# Patient Record
Sex: FEMALE | Race: WHITE | NOT HISPANIC OR LATINO | URBAN - METROPOLITAN AREA
[De-identification: names, ages, dates, MRNs, and addresses within clinical notes are randomized per-mention and may not be internally consistent; named-entity substitution may affect disease eponyms.]

---

## 2017-01-31 ENCOUNTER — APPOINTMENT (RX ONLY)
Dept: URBAN - METROPOLITAN AREA CLINIC 170 | Facility: CLINIC | Age: 52
Setting detail: DERMATOLOGY
End: 2017-01-31

## 2017-01-31 DIAGNOSIS — Z41.9 ENCOUNTER FOR PROCEDURE FOR PURPOSES OTHER THAN REMEDYING HEALTH STATE, UNSPECIFIED: ICD-10-CM

## 2017-01-31 DIAGNOSIS — L82.1 OTHER SEBORRHEIC KERATOSIS: ICD-10-CM

## 2017-01-31 PROCEDURE — ? LIQUID NITROGEN (COSMETIC)

## 2017-01-31 ASSESSMENT — LOCATION SIMPLE DESCRIPTION DERM: LOCATION SIMPLE: LEFT FOREHEAD

## 2017-01-31 ASSESSMENT — LOCATION DETAILED DESCRIPTION DERM
LOCATION DETAILED: LEFT INFERIOR FOREHEAD
LOCATION DETAILED: LEFT INFERIOR MEDIAL FOREHEAD

## 2017-01-31 ASSESSMENT — LOCATION ZONE DERM: LOCATION ZONE: FACE

## 2017-01-31 NOTE — HPI: OTHER
Please Describe Your Condition:: No known contraindications to treatment with botulinum toxin. See \"Cosmetic Procedure\" note in Attachments.

## 2017-01-31 NOTE — PROCEDURE: LIQUID NITROGEN (COSMETIC)
Detail Level: Detailed
Render Post-Care Instructions In Note?: no
Consent: The patient's consent was obtained including but not limited to risks of crusting, scabbing, blistering, scarring, darker or lighter pigmentary change, recurrence, incomplete removal and infection. The patient understands that the procedure is cosmetic in nature and is not covered by insurance.
Price (Use Numbers Only, No Special Characters Or $): 60
Post-Care Instructions: I reviewed with the patient in detail post-care instructions. Patient is to wear sunprotection, and avoid picking at any of the treated lesions. Pt may apply Vaseline to crusted or scabbing areas.

## 2017-02-01 ENCOUNTER — APPOINTMENT (RX ONLY)
Dept: URBAN - METROPOLITAN AREA CLINIC 167 | Facility: CLINIC | Age: 52
Setting detail: DERMATOLOGY
End: 2017-02-01

## 2017-02-01 DIAGNOSIS — Z41.9 ENCOUNTER FOR PROCEDURE FOR PURPOSES OTHER THAN REMEDYING HEALTH STATE, UNSPECIFIED: ICD-10-CM

## 2017-02-01 NOTE — HPI: OTHER
Condition:: Filler tx
Please Describe Your Condition:: No known contraindications to soft tissue augmentation with AMOL. See \"Cosmetic Procedure\" note in Attachments.

## 2017-05-30 ENCOUNTER — APPOINTMENT (RX ONLY)
Dept: URBAN - METROPOLITAN AREA CLINIC 170 | Facility: CLINIC | Age: 52
Setting detail: DERMATOLOGY
End: 2017-05-30

## 2017-05-30 DIAGNOSIS — Z41.9 ENCOUNTER FOR PROCEDURE FOR PURPOSES OTHER THAN REMEDYING HEALTH STATE, UNSPECIFIED: ICD-10-CM

## 2017-05-30 PROCEDURE — ? FILLERS

## 2017-05-30 PROCEDURE — ? BOTOX

## 2017-05-30 PROCEDURE — ? DYSPORT

## 2017-05-30 NOTE — HPI: OTHER
Condition:: Filler/btx-a tx
Please Describe Your Condition:: No known contraindications to soft tissue augmentation with AMOL; no known contraindications to treatment with botulinum toxin. See \"Cosmetic Procedure\" note in Attachments.

## 2017-05-30 NOTE — PROCEDURE: FILLERS
Additional Area 3 Volume In Cc: 0
Filler: Juvederm Ultra Plus XC
Additional Area 1 Location: Face
Expiration Date (Month Year): 02/19
Anesthesia Type: 1% lidocaine without epinephrine
Post-Care Instructions: Patient instructed to apply ice to reduce swelling.
Anesthesia Volume In Cc: 0.5
Map Statment: See Attach Map for Complete Details
Lot #: [id number]
Additional Area 1 Volume In Cc: 1
Lot #: [id number]
Detail Level: Zone
Additional Anesthesia Volume In Cc: 6
Use Map Statement For Sites (Optional): No
Filler: Juvederm Volbella XC
Consent: Written consent obtained. Risks include but not limited to bruising, beading, irregular texture, ulceration, infection, allergic reaction, scar formation, incomplete augmentation, temporary nature, procedural pain.

## 2017-05-30 NOTE — PROCEDURE: BOTOX
Additional Area 5 Units: 0
Expiration Date (Month Year): 12/19
Additional Area 1 Location: Face
Additional Area 1 Units: 45
Lot #: [id number]
Detail Level: Zone
Dilution (U/0.1 Cc): 4
Consent: Written consent obtained. Risks include but not limited to lid/brow ptosis, bruising, swelling, diplopia, temporary effect, incomplete chemical denervation.

## 2017-07-25 ENCOUNTER — APPOINTMENT (RX ONLY)
Dept: URBAN - METROPOLITAN AREA CLINIC 170 | Facility: CLINIC | Age: 52
Setting detail: DERMATOLOGY
End: 2017-07-25

## 2017-07-25 DIAGNOSIS — Z029 ENCOUNTERS FOR UNSPECIFIED ADMINISTRATIVE PURPOSE: ICD-10-CM

## 2017-07-25 PROBLEM — Z02.9 ENCOUNTER FOR ADMINISTRATIVE EXAMINATIONS, UNSPECIFIED: Status: ACTIVE | Noted: 2017-07-25

## 2017-07-25 PROCEDURE — ? OTHER (COSMETIC)

## 2017-07-25 NOTE — PROCEDURE: OTHER (COSMETIC)
Other (Free Text): No tx today; care of upper lip lines (cta, hydration); will defer additional filler but plan on Vb for mid lower lip and cutaneous upper lip
Detail Level: Zone

## 2017-08-24 ENCOUNTER — APPOINTMENT (RX ONLY)
Dept: URBAN - METROPOLITAN AREA CLINIC 170 | Facility: CLINIC | Age: 52
Setting detail: DERMATOLOGY
End: 2017-08-24

## 2017-08-24 DIAGNOSIS — Z41.9 ENCOUNTER FOR PROCEDURE FOR PURPOSES OTHER THAN REMEDYING HEALTH STATE, UNSPECIFIED: ICD-10-CM

## 2017-08-24 PROCEDURE — ? BOTOX

## 2017-08-24 PROCEDURE — ? DYSPORT

## 2017-08-24 NOTE — PROCEDURE: DYSPORT
Additional Area 1 Location: neck bands
Additional Area 1 Units: 40
Lot #: [id number]
Additional Area 4 Units: 0
Dilution (U/ 0.1cc): 10
Expiration Date (Month Year): 11/17
Consent: Written consent obtained. Risks include but not limited to lid/brow ptosis, bruising, swelling, diplopia, temporary effect, incomplete chemical denervation.
Detail Level: Zone

## 2017-11-01 ENCOUNTER — APPOINTMENT (RX ONLY)
Dept: URBAN - METROPOLITAN AREA CLINIC 167 | Facility: CLINIC | Age: 52
Setting detail: DERMATOLOGY
End: 2017-11-01

## 2017-11-01 DIAGNOSIS — Z41.9 ENCOUNTER FOR PROCEDURE FOR PURPOSES OTHER THAN REMEDYING HEALTH STATE, UNSPECIFIED: ICD-10-CM

## 2017-11-01 PROCEDURE — ? DYSPORT

## 2017-11-01 PROCEDURE — ? FILLERS

## 2017-11-01 PROCEDURE — ? BOTOX

## 2017-11-01 NOTE — PROCEDURE: BOTOX
Consent: Written consent obtained. Risks include but not limited to lid/brow ptosis, bruising, swelling, diplopia, temporary effect, incomplete chemical denervation.
Additional Area 1 Units: 45
Additional Area 2 Units: 0
Detail Level: Zone
Expiration Date (Month Year): 04/20
Dilution (U/0.1 Cc): 4
Lot #: [id number]
Additional Area 1 Location: Face

## 2017-11-01 NOTE — PROCEDURE: DYSPORT
Additional Area 1 Location: Neck
Dilution (U/ 0.1cc): 10
Additional Area 2 Units: 0
Expiration Date (Month Year): 02/18
Detail Level: Zone
Additional Area 1 Units: 40
Consent: Written consent obtained. Risks include but not limited to lid/brow ptosis, bruising, swelling, diplopia, temporary effect, incomplete chemical denervation.
Lot #: [id number]

## 2017-11-01 NOTE — PROCEDURE: FILLERS
Additional Area 1 Volume In Cc: 0
Filler: Juvederm Volbella XC
Consent: Written consent obtained. Risks include but not limited to bruising, beading, irregular texture, ulceration, infection, allergic reaction, scar formation, incomplete augmentation, temporary nature, procedural pain.
Lot #: [id number]
Lot #: [id number]
Anesthesia Type: 1% lidocaine without epinephrine
Additional Area 1 Volume In Cc: 0.5
Additional Anesthesia Volume In Cc: 6
Use Map Statement For Sites (Optional): No
Post-Care Instructions: Patient instructed to apply ice to reduce swelling.
Lot #: [id number]
Detail Level: Zone
Additional Area 1 Location: Face
Additional Area 1 Volume In Cc: 1
Expiration Date (Month Year): 05/19
Map Statment: See Attach Map for Complete Details
Expiration Date (Month Year): 11/18
Filler: Juvederm Ultra Plus XC

## 2017-11-01 NOTE — HPI: OTHER
Condition:: Filler/btx-a tx
Please Describe Your Condition:: No known contraindications to soft tissue augmentation with AMOL; no known contraindications to treatment with botulinum toxin. See “Cosmetic Procedure” note in Attachments.

## 2018-01-23 ENCOUNTER — APPOINTMENT (RX ONLY)
Dept: URBAN - METROPOLITAN AREA CLINIC 170 | Facility: CLINIC | Age: 53
Setting detail: DERMATOLOGY
End: 2018-01-23

## 2018-01-23 DIAGNOSIS — Z41.9 ENCOUNTER FOR PROCEDURE FOR PURPOSES OTHER THAN REMEDYING HEALTH STATE, UNSPECIFIED: ICD-10-CM

## 2018-01-23 PROCEDURE — ? BOTOX

## 2018-01-23 PROCEDURE — ? FILLERS

## 2018-01-23 NOTE — HPI: OTHER
Condition:: Filler/btx tx
Please Describe Your Condition:: No known contraindications to soft tissue augmentation with AMOL; no known contraindications to treatment with botulinum toxin. See “Cosmetic Procedure” note in Attachments.

## 2018-01-23 NOTE — PROCEDURE: BOTOX
Levator Labii Superioris Units: 0
Detail Level: Zone
Additional Area 1 Location: Face
Expiration Date (Month Year): 08/20
Lot #: [id number]
Additional Area 1 Units: 16
Consent: Written consent obtained. Risks include but not limited to lid/brow ptosis, bruising, swelling, diplopia, temporary effect, incomplete chemical denervation.
Dilution (U/0.1 Cc): 4

## 2018-01-23 NOTE — PROCEDURE: FILLERS
Expiration Date (Month Year): 7/19
Mid Face Filler  Volume In Cc: 0
Anesthesia Volume In Cc: 0.5
Consent: Written consent obtained. Risks include but not limited to bruising, beading, irregular texture, ulceration, infection, allergic reaction, scar formation, incomplete augmentation, temporary nature, procedural pain.
Post-Care Instructions: Patient instructed to apply ice to reduce swelling.
Additional Area 1 Location: Face
Additional Anesthesia Volume In Cc: 6
Use Map Statement For Sites (Optional): No
Lot #: [id number]
Lot #: [id number] *JKS special
Expiration Date (Month Year): 08/19
Lot #: [id number]
Detail Level: Zone
Anesthesia Type: 1% lidocaine without epinephrine
Filler: Juvederm Volbella XC
Map Statment: See Attach Map for Complete Details
Expiration Date (Month Year): 05/19

## 2018-04-13 ENCOUNTER — APPOINTMENT (RX ONLY)
Dept: URBAN - METROPOLITAN AREA CLINIC 170 | Facility: CLINIC | Age: 53
Setting detail: DERMATOLOGY
End: 2018-04-13

## 2018-04-13 DIAGNOSIS — Z41.9 ENCOUNTER FOR PROCEDURE FOR PURPOSES OTHER THAN REMEDYING HEALTH STATE, UNSPECIFIED: ICD-10-CM

## 2018-04-13 PROCEDURE — ? TREATMENT REGIMEN

## 2018-04-13 PROCEDURE — ? BOTOX

## 2018-04-13 ASSESSMENT — LOCATION DETAILED DESCRIPTION DERM: LOCATION DETAILED: RIGHT INFERIOR CENTRAL MALAR CHEEK

## 2018-04-13 ASSESSMENT — LOCATION SIMPLE DESCRIPTION DERM: LOCATION SIMPLE: RIGHT CHEEK

## 2018-04-13 ASSESSMENT — LOCATION ZONE DERM: LOCATION ZONE: FACE

## 2018-04-13 NOTE — PROCEDURE: TREATMENT REGIMEN
Plan: Alastin Restorative Complex am and Pm \\nEpionce Intense Nourishing \\nSPF \\nRecommended sanket for microneedling
Detail Level: Zone

## 2018-04-13 NOTE — PROCEDURE: BOTOX
Additional Area 4 Units: 0
Expiration Date (Month Year): 10/20
Price (Use Numbers Only, No Special Characters Or $): 727
Detail Level: Zone
Consent: Written consent obtained. Risks include but not limited to lid/brow ptosis, bruising, swelling, diplopia, temporary effect, incomplete chemical denervation.
Dilution (U/0.1 Cc): 0.2
Additional Area 1 Location: Face
Post-Care Instructions: Patient instructed to not lie down for 4 hours and limit physical activity for 24 hours.
Additional Area 1 Units: 79
Lot #: [id number]

## 2018-04-23 ENCOUNTER — APPOINTMENT (RX ONLY)
Dept: URBAN - METROPOLITAN AREA CLINIC 167 | Facility: CLINIC | Age: 53
Setting detail: DERMATOLOGY
End: 2018-04-23

## 2018-04-23 DIAGNOSIS — Z41.9 ENCOUNTER FOR PROCEDURE FOR PURPOSES OTHER THAN REMEDYING HEALTH STATE, UNSPECIFIED: ICD-10-CM

## 2018-04-23 PROCEDURE — ? FILLERS

## 2018-04-23 NOTE — PROCEDURE: FILLERS
Decollete Filler  Volume In Cc: 0
Additional Area 1 Location: Face
Lot #: [id number]
Lot #: [id number]
Anesthesia Volume In Cc: 0.5
Use Map Statement For Sites (Optional): No
Consent: Written consent obtained. Risks include but not limited to bruising, beading, irregular texture, ulceration, infection, allergic reaction, scar formation, incomplete augmentation, temporary nature, procedural pain.
Expiration Date (Month Year): 05/19
Post-Care Instructions: Patient instructed to apply ice to reduce swelling.
Lot #: [id number]
Anesthesia Type: 1% lidocaine without epinephrine
Expiration Date (Month Year): 12/19
Filler: Juvederm Volbella XC
Map Statment: See Attach Map for Complete Details
Detail Level: Zone
Additional Anesthesia Volume In Cc: 6

## 2018-06-29 ENCOUNTER — RX ONLY (OUTPATIENT)
Age: 53
Setting detail: RX ONLY
End: 2018-06-29

## 2018-06-29 RX ORDER — LIDOCAINE, PRILOCAINE 25; 25 MG/G; MG/G
CREAM TOPICAL
Qty: 1 | Refills: 3 | Status: ERX

## 2018-07-10 ENCOUNTER — APPOINTMENT (RX ONLY)
Dept: URBAN - METROPOLITAN AREA CLINIC 170 | Facility: CLINIC | Age: 53
Setting detail: DERMATOLOGY
End: 2018-07-10

## 2018-07-10 DIAGNOSIS — L82.1 OTHER SEBORRHEIC KERATOSIS: ICD-10-CM

## 2018-07-10 DIAGNOSIS — L57.3 POIKILODERMA OF CIVATTE: ICD-10-CM

## 2018-07-10 DIAGNOSIS — Z71.89 OTHER SPECIFIED COUNSELING: ICD-10-CM

## 2018-07-10 DIAGNOSIS — D22 MELANOCYTIC NEVI: ICD-10-CM

## 2018-07-10 PROBLEM — D22.5 MELANOCYTIC NEVI OF TRUNK: Status: ACTIVE | Noted: 2018-07-10

## 2018-07-10 PROCEDURE — ? COUNSELING

## 2018-07-10 PROCEDURE — 99213 OFFICE O/P EST LOW 20 MIN: CPT

## 2018-07-10 ASSESSMENT — LOCATION SIMPLE DESCRIPTION DERM
LOCATION SIMPLE: RIGHT ANTERIOR NECK
LOCATION SIMPLE: LEFT ANTERIOR NECK
LOCATION SIMPLE: LEFT UPPER BACK

## 2018-07-10 ASSESSMENT — LOCATION DETAILED DESCRIPTION DERM
LOCATION DETAILED: LEFT SUPERIOR ANTERIOR NECK
LOCATION DETAILED: LEFT MEDIAL UPPER BACK
LOCATION DETAILED: RIGHT SUPERIOR ANTERIOR NECK

## 2018-07-10 ASSESSMENT — LOCATION ZONE DERM
LOCATION ZONE: TRUNK
LOCATION ZONE: NECK

## 2018-07-25 ENCOUNTER — APPOINTMENT (RX ONLY)
Dept: URBAN - METROPOLITAN AREA CLINIC 167 | Facility: CLINIC | Age: 53
Setting detail: DERMATOLOGY
End: 2018-07-25

## 2018-07-25 DIAGNOSIS — Z41.9 ENCOUNTER FOR PROCEDURE FOR PURPOSES OTHER THAN REMEDYING HEALTH STATE, UNSPECIFIED: ICD-10-CM

## 2018-07-25 PROCEDURE — ? BOTOX

## 2018-07-25 PROCEDURE — ? DYSPORT

## 2018-07-25 PROCEDURE — ? FILLERS

## 2018-07-25 NOTE — PROCEDURE: FILLERS
Additional Area 2 Volume In Cc: 0
Expiration Date (Month Year): 05/19
Detail Level: Zone
Consent: Written consent obtained. Risks include but not limited to bruising, beading, irregular texture, ulceration, infection, allergic reaction, scar formation, incomplete augmentation, temporary nature, procedural pain.
Anesthesia Volume In Cc: 0.5
Anesthesia Type: 1% lidocaine without epinephrine
Filler: Juvederm Ultra XC
Use Map Statement For Sites (Optional): No
Lot #: [id number]
Additional Area 1 Location: Face
Lot #: [id number]
Post-Care Instructions: Patient instructed to apply ice to reduce swelling.
Map Statment: See Attach Map for Complete Details
Additional Anesthesia Volume In Cc: 6
Lot #: [id number]
Additional Area 1 Volume In Cc: 1

## 2018-07-25 NOTE — HPI: OTHER
Condition:: Filler/btx-a treatment
Please Describe Your Condition:: comes in for Filler/btx-a treatment . No known contraindications to soft tissue augmentation with AMOL; no known contraindications to treatment with botulinum toxin. See “Cosmetic Procedure” note in Attachments.

## 2018-07-25 NOTE — PROCEDURE: DYSPORT
Additional Area 1 Units: 40
Forehead Units: 0
Expiration Date (Month Year): 12/18
Dilution (U/ 0.1cc): 10
No
Additional Area 1 Location: Neck
Lot #: [id number]
Detail Level: Zone
Consent: Written consent obtained. Risks include but not limited to lid/brow ptosis, bruising, swelling, diplopia, temporary effect, incomplete chemical denervation.

## 2018-08-22 ENCOUNTER — APPOINTMENT (RX ONLY)
Dept: URBAN - METROPOLITAN AREA CLINIC 167 | Facility: CLINIC | Age: 53
Setting detail: DERMATOLOGY
End: 2018-08-22

## 2018-08-22 DIAGNOSIS — Z41.9 ENCOUNTER FOR PROCEDURE FOR PURPOSES OTHER THAN REMEDYING HEALTH STATE, UNSPECIFIED: ICD-10-CM

## 2018-08-22 PROCEDURE — ? FILLERS

## 2018-08-22 NOTE — PROCEDURE: FILLERS
Decollete Filler  Volume In Cc: 0
Include Cannula Information In Note?: No
Expiration Date (Month Year): 01/20
Post-Care Instructions: Patient instructed to apply ice to reduce swelling.
Anesthesia Type: 1% lidocaine without epinephrine
Additional Area 1 Location: Face
Expiration Date (Month Year): 12/19
Filler: Juvederm Volbella XC
Lot #: [id number]
Map Statment: See Attach Map for Complete Details
Detail Level: Zone
Lot #: [id number]
Consent: Written consent obtained. Risks include but not limited to bruising, beading, irregular texture, ulceration, infection, allergic reaction, scar formation, incomplete augmentation, temporary nature, procedural pain.
Additional Anesthesia Volume In Cc: 6
Anesthesia Volume In Cc: 0.5
Lot #: [id number]
Expiration Date (Month Year): 10/20

## 2018-11-08 ENCOUNTER — APPOINTMENT (RX ONLY)
Dept: URBAN - METROPOLITAN AREA CLINIC 170 | Facility: CLINIC | Age: 53
Setting detail: DERMATOLOGY
End: 2018-11-08

## 2018-11-08 DIAGNOSIS — Z41.9 ENCOUNTER FOR PROCEDURE FOR PURPOSES OTHER THAN REMEDYING HEALTH STATE, UNSPECIFIED: ICD-10-CM

## 2018-11-08 PROCEDURE — ? FILLERS

## 2018-11-08 PROCEDURE — ? BOTOX

## 2018-11-08 PROCEDURE — ? DYSPORT

## 2018-11-08 NOTE — PROCEDURE: DYSPORT
Expiration Date (Month Year): 05/19
Additional Area 6 Units: 0
Additional Area 1 Units: 40
Detail Level: Zone
Lot #: [id number]
Dilution (U/ 0.1cc): 10
Consent: Written consent obtained. Risks include but not limited to lid/brow ptosis, bruising, swelling, diplopia, temporary effect, incomplete chemical denervation.
Additional Area 1 Location: Face and neck bands

## 2018-11-08 NOTE — PROCEDURE: FILLERS
Mid Face Filler  Volume In Cc: 0
Include Cannula Information In Note?: No
Lot #: [id number]
Detail Level: Zone
Expiration Date (Month Year): 10/19
Additional Area 1 Volume In Cc: 0.5
Map Statment: See Attach Map for Complete Details
Expiration Date (Month Year): 06/20
Lot #: [id number]
Lot #: [id number]
Additional Area 1 Location: Face
Filler: Juvederm Volbella XC
Anesthesia Type: 1% lidocaine without epinephrine
Additional Anesthesia Volume In Cc: 6
Consent: Written consent obtained. Risks include but not limited to bruising, beading, irregular texture, ulceration, infection, allergic reaction, scar formation, incomplete augmentation, temporary nature, procedural pain.
Post-Care Instructions: Patient instructed to apply ice to reduce swelling.

## 2018-11-08 NOTE — HPI: OTHER
Condition:: Filler/btx-a tx
Please Describe Your Condition:: comes in for Filler/btx-a tx . No known contraindications to soft tissue augmentation with AMOL; no known contraindications to treatment with botulinum toxin. See “Cosmetic Procedure” note in Attachments.

## 2018-11-08 NOTE — PROCEDURE: BOTOX
Glabellar Complex Units: 0
Additional Area 1 Units: 45
Dilution (U/0.1 Cc): 4
Additional Area 1 Location: Face
Consent: Written consent obtained. Risks include but not limited to lid/brow ptosis, bruising, swelling, diplopia, temporary effect, incomplete chemical denervation.
Lot #: [id number]
Expiration Date (Month Year): 04/21
Detail Level: Zone

## 2019-01-31 ENCOUNTER — APPOINTMENT (RX ONLY)
Dept: URBAN - METROPOLITAN AREA CLINIC 170 | Facility: CLINIC | Age: 54
Setting detail: DERMATOLOGY
End: 2019-01-31

## 2019-01-31 DIAGNOSIS — Z41.9 ENCOUNTER FOR PROCEDURE FOR PURPOSES OTHER THAN REMEDYING HEALTH STATE, UNSPECIFIED: ICD-10-CM

## 2019-01-31 PROCEDURE — ? PATIENT SPECIFIC COUNSELING

## 2019-01-31 PROCEDURE — ? BOTOX

## 2019-01-31 ASSESSMENT — LOCATION SIMPLE DESCRIPTION DERM
LOCATION SIMPLE: LEFT CHEEK
LOCATION SIMPLE: LEFT LIP

## 2019-01-31 ASSESSMENT — LOCATION ZONE DERM
LOCATION ZONE: FACE
LOCATION ZONE: LIP

## 2019-01-31 ASSESSMENT — LOCATION DETAILED DESCRIPTION DERM
LOCATION DETAILED: LEFT CENTRAL MALAR CHEEK
LOCATION DETAILED: LEFT LOWER CUTANEOUS LIP

## 2019-01-31 NOTE — PROCEDURE: BOTOX
Nasal Root Units: 0
Lot #: [id number]
Consent: Written consent obtained. Risks include but not limited to lid/brow ptosis, bruising, swelling, diplopia, temporary effect, incomplete chemical denervation.
Additional Area 1 Location: face
Expiration Date (Month Year): 5/21
Detail Level: Zone
Additional Area 1 Units: 83
Dilution (U/0.1 Cc): 0.2
Post-Care Instructions: Patient instructed to not lie down for 4 hours and limit physical activity for 24 hours.
Price (Use Numbers Only, No Special Characters Or $): 814

## 2019-01-31 NOTE — PROCEDURE: PATIENT SPECIFIC COUNSELING
Recommend 3 treatments of SkinPen microneedling $550/tx. Continue Alastin neck cream daily, and alastin restorative twice a day after finishing bottle of AlphaRet. Alpha Ret as tolerated to face everynight at bedtime.
Detail Level: Zone

## 2019-04-22 ENCOUNTER — APPOINTMENT (RX ONLY)
Dept: URBAN - METROPOLITAN AREA CLINIC 167 | Facility: CLINIC | Age: 54
Setting detail: DERMATOLOGY
End: 2019-04-22

## 2019-04-22 DIAGNOSIS — Z41.9 ENCOUNTER FOR PROCEDURE FOR PURPOSES OTHER THAN REMEDYING HEALTH STATE, UNSPECIFIED: ICD-10-CM

## 2019-04-22 PROCEDURE — ? DYSPORT

## 2019-04-22 PROCEDURE — ? BOTOX

## 2019-04-22 NOTE — HPI: OTHER
Condition:: Btx-a tx
Please Describe Your Condition:: No known contraindications to treatment with botulinum toxin. see “Cosmetic Procedure” note in Attachments.

## 2019-04-22 NOTE — PROCEDURE: DYSPORT
Consent: Written consent obtained. Risks include but not limited to lid/brow ptosis, bruising, swelling, diplopia, temporary effect, incomplete chemical denervation.
Dilution (U/ 0.1cc): 10
Lateral Platysmal Bands Units: 0
Detail Level: Zone
Additional Area 1 Units: 40
Expiration Date (Month Year): 05/19
Lot #: [id number]
Additional Area 1 Location: Face and neck bands
Patient expressed no known problems or needs

## 2019-04-22 NOTE — PROCEDURE: BOTOX
Masseter Units: 0
Additional Area 1 Units: 45
Dilution (U/0.1 Cc): 4
Consent: Written consent obtained. Risks include but not limited to lid/brow ptosis, bruising, swelling, diplopia, temporary effect, incomplete chemical denervation.
Additional Area 1 Location: Face
Expiration Date (Month Year): 9/21
Detail Level: Zone
Lot #: [id number]

## 2019-05-30 ENCOUNTER — APPOINTMENT (RX ONLY)
Dept: URBAN - METROPOLITAN AREA CLINIC 170 | Facility: CLINIC | Age: 54
Setting detail: DERMATOLOGY
End: 2019-05-30

## 2019-05-30 DIAGNOSIS — Z41.9 ENCOUNTER FOR PROCEDURE FOR PURPOSES OTHER THAN REMEDYING HEALTH STATE, UNSPECIFIED: ICD-10-CM

## 2019-05-30 PROCEDURE — ? DYSPORT

## 2019-05-30 NOTE — PROCEDURE: DYSPORT
Detail Level: Zone
Anterior Platysmal Bands Units: 0
Expiration Date (Month Year): 12/19
Lot #: [id number]
Additional Area 1 Location: Face
Dilution (U/ 0.1cc): 10
Additional Area 1 Units: 30
Consent: Written consent obtained. Risks include but not limited to lid/brow ptosis, bruising, swelling, diplopia, temporary effect, incomplete chemical denervation.

## 2019-05-30 NOTE — HPI: OTHER
Condition:: Cosmetic f/u
Please Describe Your Condition:: comes in for Cosmetic f/u . No known contraindications to treatment with botulinum toxin. See “Cosmetic Procedure” note in Attachments.

## 2019-07-09 ENCOUNTER — APPOINTMENT (RX ONLY)
Dept: URBAN - METROPOLITAN AREA CLINIC 170 | Facility: CLINIC | Age: 54
Setting detail: DERMATOLOGY
End: 2019-07-09

## 2019-07-09 DIAGNOSIS — Z41.9 ENCOUNTER FOR PROCEDURE FOR PURPOSES OTHER THAN REMEDYING HEALTH STATE, UNSPECIFIED: ICD-10-CM

## 2019-07-09 PROCEDURE — ? BOTOX

## 2019-07-09 PROCEDURE — ? FILLERS

## 2019-07-09 NOTE — PROCEDURE: FILLERS
Cheeks Filler Volume In Cc: 0
Lot #: [id number]
Lot #: [id number] * KIRT Special *
Map Statment: See Attach Map for Complete Details
Expiration Date (Month Year): 04/20
Expiration Date (Month Year): 12/21
Include Cannula Information In Note?: No
Anesthesia Type: 1% lidocaine without epinephrine
Lot #: [id number]
Anesthesia Volume In Cc: 0.5
Expiration Date (Month Year): 01/20
Additional Area 1 Location: Face
Additional Anesthesia Volume In Cc: 6
Consent: Written consent obtained. Risks include but not limited to bruising, beading, irregular texture, ulceration, infection, allergic reaction, scar formation, incomplete augmentation, temporary nature, procedural pain.
Post-Care Instructions: Patient instructed to apply ice to reduce swelling.
Filler: Juvederm Volbella XC
Detail Level: Zone

## 2019-07-09 NOTE — PROCEDURE: BOTOX
Additional Area 5 Units: 0
Dilution (U/0.1 Cc): 4
Expiration Date (Month Year): 10/21
Additional Area 1 Units: 35
Detail Level: Zone
Lot #: [id number]
Additional Area 1 Location: Face
Consent: Written consent obtained. Risks include but not limited to lid/brow ptosis, bruising, swelling, diplopia, temporary effect, incomplete chemical denervation.

## 2019-07-09 NOTE — HPI: OTHER
Condition:: Filler/Botox-a tx
Please Describe Your Condition:: comes in for Filler/Botox-a tx . No known contraindications to soft tissue augmentation with AMOL; no known contraindications to treatment with botulinum toxin. See “Cosmetic Procedure” note in Attachments

## 2019-10-09 ENCOUNTER — APPOINTMENT (RX ONLY)
Dept: URBAN - METROPOLITAN AREA CLINIC 167 | Facility: CLINIC | Age: 54
Setting detail: DERMATOLOGY
End: 2019-10-09

## 2019-10-09 DIAGNOSIS — Z41.9 ENCOUNTER FOR PROCEDURE FOR PURPOSES OTHER THAN REMEDYING HEALTH STATE, UNSPECIFIED: ICD-10-CM

## 2019-10-09 PROCEDURE — ? FILLERS

## 2019-10-09 PROCEDURE — ? BOTOX

## 2019-10-09 NOTE — HPI: OTHER
Condition:: Filler/btx-a treatment
Please Describe Your Condition:: comes in for Filler/btx-a treatment. No known contraindications to soft tissue augmentation with AMOL ; no known contraindications to treatment with botulinum toxin. See  “Cosmetic Procedure” note in Attachments.

## 2019-10-09 NOTE — PROCEDURE: BOTOX
Show Mentalis Units: No
Right Periorbital Units: 0
Show Nasal Units: Yes
Detail Level: Zone
Additional Area 1 Location: Face
Expiration Date (Month Year): 2/22
Additional Area 1 Units: 25
Consent: Written consent obtained. Risks include but not limited to lid/brow ptosis, bruising, swelling, diplopia, temporary effect, incomplete chemical denervation.
Lot #: [id number]
Dilution (U/0.1 Cc): 4

## 2019-10-09 NOTE — PROCEDURE: FILLERS
Additional Area 2 Volume In Cc: 0
Lot #: [id number]
Include Cannula Information In Note?: No
Lot #: [id number]
Expiration Date (Month Year): 01/20
Filler: Juvederm Volbella XC
Expiration Date (Month Year): 04/20
Detail Level: Zone
Lot #: [id number] *KIRT Special*
Map Statment: See Attach Map for Complete Details
Expiration Date (Month Year): 12/20/19
Additional Area 1 Location: Face
Anesthesia Type: 1% lidocaine without epinephrine
Anesthesia Volume In Cc: 0.5
Consent: Written consent obtained. Risks include but not limited to bruising, beading, irregular texture, ulceration, infection, allergic reaction, scar formation, incomplete augmentation, temporary nature, procedural pain.
Post-Care Instructions: Patient instructed to apply ice to reduce swelling.
Additional Anesthesia Volume In Cc: 6

## 2019-10-21 ENCOUNTER — APPOINTMENT (RX ONLY)
Dept: URBAN - METROPOLITAN AREA CLINIC 167 | Facility: CLINIC | Age: 54
Setting detail: DERMATOLOGY
End: 2019-10-21

## 2019-10-21 DIAGNOSIS — Z41.9 ENCOUNTER FOR PROCEDURE FOR PURPOSES OTHER THAN REMEDYING HEALTH STATE, UNSPECIFIED: ICD-10-CM

## 2019-10-21 PROCEDURE — ? BOTOX

## 2019-10-21 NOTE — PROCEDURE: BOTOX
Show Mentalis Units: No
Additional Area 5 Units: 0
Show Additional Area 4: Yes
Additional Area 1 Location: Face
Expiration Date (Month Year): 02/22
Additional Area 1 Units: 40
Lot #: [id number]
Consent: Written consent obtained. Risks include but not limited to lid/brow ptosis, bruising, swelling, diplopia, temporary effect, incomplete chemical denervation.
Dilution (U/0.1 Cc): 4
Detail Level: Zone

## 2020-02-10 ENCOUNTER — APPOINTMENT (RX ONLY)
Dept: URBAN - METROPOLITAN AREA CLINIC 173 | Facility: CLINIC | Age: 55
Setting detail: DERMATOLOGY
End: 2020-02-10

## 2020-02-10 DIAGNOSIS — Z41.9 ENCOUNTER FOR PROCEDURE FOR PURPOSES OTHER THAN REMEDYING HEALTH STATE, UNSPECIFIED: ICD-10-CM

## 2020-02-10 PROCEDURE — ? BOTOX

## 2020-02-10 NOTE — HPI: OTHER
Condition:: Btx-a tx
Please Describe Your Condition:: comes in for Btx-a tx . No known contraindications to treatment with botulinum toxin. See “Cosmetic Procedure” note in attachments.

## 2020-02-10 NOTE — PROCEDURE: BOTOX
Nasal Root Units: 0
Show Lcl Units: No
Show Lateral Platysmal Band Units: Yes
Dilution (U/0.1 Cc): 4
Detail Level: Zone
Expiration Date (Month Year): 8/22
Additional Area 1 Location: Face
Additional Area 1 Units: 44
Consent: Written consent obtained. Risks include but not limited to lid/brow ptosis, bruising, swelling, diplopia, temporary effect, incomplete chemical denervation.
Lot #: [id number]

## 2020-02-25 ENCOUNTER — APPOINTMENT (RX ONLY)
Dept: URBAN - METROPOLITAN AREA CLINIC 173 | Facility: CLINIC | Age: 55
Setting detail: DERMATOLOGY
End: 2020-02-25

## 2020-02-25 DIAGNOSIS — Z41.9 ENCOUNTER FOR PROCEDURE FOR PURPOSES OTHER THAN REMEDYING HEALTH STATE, UNSPECIFIED: ICD-10-CM

## 2020-02-25 PROCEDURE — ? DYSPORT

## 2020-02-25 NOTE — PROCEDURE: DYSPORT
Detail Level: Zone
Anterior Platysmal Bands Units: 0
Lot #: [id number]
Additional Area 1 Units: 70
Expiration Date (Month Year): 5/31/20
Dilution (U/ 0.1cc): 10
Consent: Written consent obtained. Risks include but not limited to lid/brow ptosis, bruising, swelling, diplopia, temporary effect, incomplete chemical denervation.
Additional Area 1 Location: face & neck bands

## 2020-05-14 ENCOUNTER — APPOINTMENT (RX ONLY)
Dept: URBAN - METROPOLITAN AREA CLINIC 173 | Facility: CLINIC | Age: 55
Setting detail: DERMATOLOGY
End: 2020-05-14

## 2020-05-14 DIAGNOSIS — Z41.9 ENCOUNTER FOR PROCEDURE FOR PURPOSES OTHER THAN REMEDYING HEALTH STATE, UNSPECIFIED: ICD-10-CM

## 2020-05-14 PROCEDURE — ? FILLERS

## 2020-05-14 PROCEDURE — ? BOTOX

## 2020-05-14 NOTE — PROCEDURE: BOTOX
Depressor Anguli Oris Units: 0
Lot #: [id number]
Show Additional Area 4: Yes
Consent: Written consent obtained. Risks include but not limited to lid/brow ptosis, bruising, swelling, diplopia, temporary effect, incomplete chemical denervation.
Show Ucl Units: No
Dilution (U/0.1 Cc): 4
Additional Area 1 Location: Face
Expiration Date (Month Year): 09/22
Additional Area 1 Units: 44
Detail Level: Zone

## 2020-05-14 NOTE — PROCEDURE: FILLERS
Additional Area 3 Volume In Cc: 0
Detail Level: Zone
Filler: Juvederm Volbella XC
Use Map Statement For Sites (Optional): No
Lot #: [id number]
Map Statment: See Attach Map for Complete Details
Lot #: [id number]*KIRT SPECIAL*
Expiration Date (Month Year): 01/20
Consent: Written consent obtained. Risks include but not limited to bruising, beading, irregular texture, ulceration, infection, allergic reaction, scar formation, incomplete augmentation, temporary nature, procedural pain.
Lot #: [id number] *KIRT Special *
Post-Care Instructions: Patient instructed to apply ice to reduce swelling.
Expiration Date (Month Year): 08/20
Anesthesia Type: 1% lidocaine without epinephrine
Expiration Date (Month Year): 12/19
Anesthesia Volume In Cc: 0.5
Additional Area 1 Location: Face
Additional Anesthesia Volume In Cc: 6

## 2020-05-14 NOTE — HPI: OTHER
Condition:: Filler/btx-a tx
Please Describe Your Condition:: is being seen for Filler/btx-a tx . No known contraindications to soft tissue augmentation with AMOL; no known contraindications to treatment with botulinum toxin. See “Cosmetic Procedure”note in Attachments.

## 2020-08-12 ENCOUNTER — APPOINTMENT (RX ONLY)
Dept: URBAN - METROPOLITAN AREA CLINIC 173 | Facility: CLINIC | Age: 55
Setting detail: DERMATOLOGY
End: 2020-08-12

## 2020-08-12 DIAGNOSIS — Z41.9 ENCOUNTER FOR PROCEDURE FOR PURPOSES OTHER THAN REMEDYING HEALTH STATE, UNSPECIFIED: ICD-10-CM

## 2020-08-12 PROCEDURE — ? DYSPORT

## 2020-08-12 PROCEDURE — ? BOTOX

## 2020-08-12 NOTE — PROCEDURE: DYSPORT
Show Additional Area 6: Yes
Show Ucl Units: No
Additional Area 5 Units: 0
Consent: Written consent obtained. Risks include but not limited to lid/brow ptosis, bruising, swelling, diplopia, temporary effect, incomplete chemical denervation.
Expiration Date (Month Year): 11/20
Additional Area 1 Location: Neck bands
Dilution (U/ 0.1cc): 10
Detail Level: Zone
Lot #: [id number]
Additional Area 1 Units: 50

## 2020-08-12 NOTE — PROCEDURE: BOTOX
Additional Area 4 Units: 0
Show Additional Area 4: Yes
Detail Level: Zone
Expiration Date (Month Year): 02/23
Additional Area 1 Units: 44
Show Right And Left Brow Units: No
Additional Area 1 Location: Face
Dilution (U/0.1 Cc): 4
Consent: Written consent obtained. Risks include but not limited to lid/brow ptosis, bruising, swelling, diplopia, temporary effect, incomplete chemical denervation.
Lot #: [id number]

## 2020-11-10 ENCOUNTER — APPOINTMENT (RX ONLY)
Dept: URBAN - METROPOLITAN AREA CLINIC 173 | Facility: CLINIC | Age: 55
Setting detail: DERMATOLOGY
End: 2020-11-10

## 2020-11-10 DIAGNOSIS — Z41.9 ENCOUNTER FOR PROCEDURE FOR PURPOSES OTHER THAN REMEDYING HEALTH STATE, UNSPECIFIED: ICD-10-CM

## 2020-11-10 PROCEDURE — ? FILLERS

## 2020-11-10 PROCEDURE — ? BOTOX

## 2020-11-10 PROCEDURE — ? DYSPORT

## 2020-11-10 NOTE — PROCEDURE: FILLERS
Dorsal Hands Filler  Volume In Cc: 0
Additional Anesthesia Volume In Cc: 6
Additional Area 1 Location: Face
Additional Area 1 Volume In Cc: 1
Include Cannula Information In Note?: No
Detail Level: Zone
Filler: RHA 2
Lot #: [id number] *KIRT Special *
Map Statment: See Attach Map for Complete Details
Lot #: [id number]
Expiration Date (Month Year): 12/19
Lot #: [id number]
Expiration Date (Month Year): 01/20
Expiration Date (Month Year): 02/24/23
Anesthesia Type: 1% lidocaine without epinephrine
Anesthesia Volume In Cc: 0.5
Consent: Written consent obtained. Risks include but not limited to bruising, beading, irregular texture, ulceration, infection, allergic reaction, scar formation, incomplete augmentation, temporary nature, procedural pain.
Post-Care Instructions: Patient instructed to apply ice to reduce swelling.

## 2020-11-10 NOTE — PROCEDURE: DYSPORT
Dilution (U/ 0.1cc): 10
Glabellar Complex Units: 0
Additional Area 1 Location: Face and Neckbands
Show Levator Superior Units: Yes
Show Mentalis Units: No
Lot #: [id number]
Additional Area 1 Units: 50
Detail Level: Zone
Consent: Written consent obtained. Risks include but not limited to lid/brow ptosis, bruising, swelling, diplopia, temporary effect, incomplete chemical denervation.
Expiration Date (Month Year): 02/21

## 2020-11-10 NOTE — PROCEDURE: BOTOX
Lot #: [id number]
Show Levator Superior Units: Yes
Lateral Platysmal Bands Units: 0
Show Mentalis Units: No
Dilution (U/0.1 Cc): 4
Additional Area 1 Location: Face
Consent: Written consent obtained. Risks include but not limited to lid/brow ptosis, bruising, swelling, diplopia, temporary effect, incomplete chemical denervation.
Detail Level: Zone
Additional Area 1 Units: 44
Expiration Date (Month Year): 06/23

## 2021-01-06 ENCOUNTER — APPOINTMENT (RX ONLY)
Dept: URBAN - METROPOLITAN AREA CLINIC 173 | Facility: CLINIC | Age: 56
Setting detail: DERMATOLOGY
End: 2021-01-06

## 2021-01-06 DIAGNOSIS — Z41.9 ENCOUNTER FOR PROCEDURE FOR PURPOSES OTHER THAN REMEDYING HEALTH STATE, UNSPECIFIED: ICD-10-CM

## 2021-01-06 PROCEDURE — ? DYSPORT

## 2021-01-06 PROCEDURE — ? BOTOX

## 2021-01-06 NOTE — PROCEDURE: DYSPORT
Show Orbicularis Oculi Units: Yes
Mentalis Units: 0
Additional Area 1 Units: 30
Lot #: [id number]
Consent: Written consent obtained. Risks include but not limited to lid/brow ptosis, bruising, swelling, diplopia, temporary effect, incomplete chemical denervation.
Show Right And Left Pupillary Line Units: No
Dilution (U/ 0.1cc): 10
Detail Level: Zone
Expiration Date (Month Year): 8/21
Additional Area 1 Location: Face

## 2021-01-06 NOTE — PROCEDURE: BOTOX
Additional Area 1 Units: 15
Expiration Date (Month Year): 8/23
L Brow Units: 0
Show Mentalis Units: No
Show Additional Area 2: Yes
Detail Level: Zone
Lot #: [id number]
Consent: Written consent obtained. Risks include but not limited to lid/brow ptosis, bruising, swelling, diplopia, temporary effect, incomplete chemical denervation.
Dilution (U/0.1 Cc): 4
Additional Area 1 Location: Face

## 2021-02-23 ENCOUNTER — APPOINTMENT (RX ONLY)
Dept: URBAN - METROPOLITAN AREA CLINIC 173 | Facility: CLINIC | Age: 56
Setting detail: DERMATOLOGY
End: 2021-02-23

## 2021-02-23 ENCOUNTER — RX ONLY (OUTPATIENT)
Age: 56
Setting detail: RX ONLY
End: 2021-02-23

## 2021-02-23 DIAGNOSIS — Z41.9 ENCOUNTER FOR PROCEDURE FOR PURPOSES OTHER THAN REMEDYING HEALTH STATE, UNSPECIFIED: ICD-10-CM

## 2021-02-23 PROCEDURE — ? THERMAGE FLX

## 2021-02-23 RX ORDER — LIDOCAINE AND PRILOCAINE 25; 25 MG/G; MG/G
CREAM TOPICAL
Qty: 1 | Refills: 3 | Status: ERX

## 2021-02-23 ASSESSMENT — LOCATION DETAILED DESCRIPTION DERM
LOCATION DETAILED: LEFT INFERIOR CENTRAL MALAR CHEEK
LOCATION DETAILED: RIGHT SUPERIOR ANTERIOR NECK

## 2021-02-23 ASSESSMENT — LOCATION SIMPLE DESCRIPTION DERM
LOCATION SIMPLE: RIGHT ANTERIOR NECK
LOCATION SIMPLE: LEFT CHEEK

## 2021-02-23 ASSESSMENT — LOCATION ZONE DERM
LOCATION ZONE: NECK
LOCATION ZONE: FACE

## 2021-02-23 NOTE — PROCEDURE: THERMAGE FLX
Post-Care Instructions: I reviewed with the patient in detail post-care instructions. Patient should stay away from the sun and wear sun protection until treated areas are fully healed.
Post-Procedures Photographs: Yes
Repetitions: 900
Treatment Number: 1
Number Of Vectors: 12
Post-Procedure Text: Post care reviewed with patient.
Thermage Tip: Face Tip 4.0
Minimum Treatment Setting: 0.5
Consent: Written consent obtained, risks reviewed including but not limited to crusting, scabbing, blistering, scarring, darker or lighter pigmentary change, and/or incomplete removal.
Detail Level: Zone
Maximum Treatment Settin.5
Number Of Passes: 10
Patient Discomfort: mild
Treatment Endpoint: visual tightening
Immediate Post-Procedure Findings: moderate erythema, mild edema
Price (Use Numbers Only, No Special Characters Or $): 3789

## 2021-02-23 NOTE — HPI: COSMETIC PROCEDURE
Additional History: She has had one previous treatment with minimal improvement. ([facility])

## 2021-03-01 ENCOUNTER — APPOINTMENT (RX ONLY)
Dept: URBAN - METROPOLITAN AREA CLINIC 173 | Facility: CLINIC | Age: 56
Setting detail: DERMATOLOGY
End: 2021-03-01

## 2021-03-01 DIAGNOSIS — Z41.9 ENCOUNTER FOR PROCEDURE FOR PURPOSES OTHER THAN REMEDYING HEALTH STATE, UNSPECIFIED: ICD-10-CM

## 2021-03-01 PROCEDURE — ? DIAMONDGLOW

## 2021-03-01 PROCEDURE — ? DERMAPLANE

## 2021-03-01 ASSESSMENT — LOCATION DETAILED DESCRIPTION DERM
LOCATION DETAILED: LEFT CENTRAL MALAR CHEEK
LOCATION DETAILED: LEFT INFERIOR CENTRAL MALAR CHEEK

## 2021-03-01 ASSESSMENT — LOCATION SIMPLE DESCRIPTION DERM: LOCATION SIMPLE: LEFT CHEEK

## 2021-03-01 ASSESSMENT — LOCATION ZONE DERM: LOCATION ZONE: FACE

## 2021-03-01 NOTE — PROCEDURE: DIAMONDGLOW
Price (Use Numbers Only, No Special Characters Or $): 700
Post-Care Instructions: I reviewed with the patient in detail post-care instructions. Patient should stay away from the sun and wear sun protection until treated areas are fully healed.
Detail Level: Zone
Solution Used: Skin Hydrating Solution
Number Of Passes: 2
Consent: Written consent obtained, risks reviewed including but not limited to crusting, scabbing, blistering, scarring, darker or lighter pigmentary change, bruising, and/or incomplete response.
Facial Treatment Head Used?: Facial: Fine (120 grit) 6 mm
Intelliflow Setting: 100%
Body Treatment Head Used?: Not Used
Vacuum Pressure In Inches: 3.5

## 2021-03-01 NOTE — PROCEDURE: DERMAPLANE
Pre-Procedure Text: The patient was placed in a recumbant position on the procedure table.
Detail Level: Zone
Blade: 10 blade scalpel
Post-Procedure Instructions: Following the dermaplane procedure, a moisturizer and SPF was applied to the treatment areas.
Treatment Areas: face
Treatment Area Prep: alcohol
Post-Care Instructions: I reviewed with the patient in detail post-care instructions.

## 2021-03-08 ENCOUNTER — APPOINTMENT (RX ONLY)
Dept: URBAN - METROPOLITAN AREA CLINIC 173 | Facility: CLINIC | Age: 56
Setting detail: DERMATOLOGY
End: 2021-03-08

## 2021-03-08 DIAGNOSIS — Z41.9 ENCOUNTER FOR PROCEDURE FOR PURPOSES OTHER THAN REMEDYING HEALTH STATE, UNSPECIFIED: ICD-10-CM

## 2021-03-08 PROCEDURE — ? DYSPORT

## 2021-03-08 PROCEDURE — ? BOTOX

## 2021-03-08 NOTE — PROCEDURE: DYSPORT
L Brow Units: 0
Show Right And Left Periorbital Units: No
Show Masseter Units: Yes
Detail Level: Zone
Additional Area 1 Location: Face
Consent: Written consent obtained. Risks include but not limited to lid/brow ptosis, bruising, swelling, diplopia, temporary effect, incomplete chemical denervation.
Additional Area 1 Units: 50
Dilution (U/ 0.1cc): 10
Lot #: [id number]
Expiration Date (Month Year): 9/21

## 2021-05-10 ENCOUNTER — APPOINTMENT (RX ONLY)
Dept: URBAN - METROPOLITAN AREA CLINIC 173 | Facility: CLINIC | Age: 56
Setting detail: DERMATOLOGY
End: 2021-05-10

## 2021-05-10 DIAGNOSIS — Z41.9 ENCOUNTER FOR PROCEDURE FOR PURPOSES OTHER THAN REMEDYING HEALTH STATE, UNSPECIFIED: ICD-10-CM

## 2021-05-10 PROCEDURE — ? FILLERS

## 2021-05-10 PROCEDURE — ? BOTOX

## 2021-05-10 NOTE — PROCEDURE: FILLERS
Additional Area 4 Volume In Cc: 0
Include Cannula Information In Note?: No
Lot #: [id number]
Anesthesia Volume In Cc: 0.5
Expiration Date (Month Year): 08/23
Expiration Date (Month Year): 01/20
Lot #: [id number]
Additional Area 1 Location: Face
Additional Area 1 Volume In Cc: 1
Detail Level: Zone
Additional Anesthesia Volume In Cc: 6
Consent: Written consent obtained. Risks include but not limited to bruising, beading, irregular texture, ulceration, infection, allergic reaction, scar formation, incomplete augmentation, temporary nature, procedural pain.
Map Statment: See Attach Map for Complete Details
Filler: RHA 2
Post-Care Instructions: Patient instructed to apply ice to reduce swelling.
Filler: RHA 3
Anesthesia Type: 1% lidocaine without epinephrine
Lot #: [id number]

## 2021-05-10 NOTE — PROCEDURE: BOTOX
Right Periorbital Units: 0
Consent: Written consent obtained. Risks include but not limited to lid/brow ptosis, bruising, swelling, diplopia, temporary effect, incomplete chemical denervation.
Show Right And Left Pupillary Line Units: No
Show Masseter Units: Yes
Dilution (U/0.1 Cc): 4
Lot #: [id number] * SPECIAL *
Additional Area 1 Location: Face
Expiration Date (Month Year): 07/23
Detail Level: Zone

## 2021-07-23 ENCOUNTER — APPOINTMENT (RX ONLY)
Dept: URBAN - METROPOLITAN AREA CLINIC 173 | Facility: CLINIC | Age: 56
Setting detail: DERMATOLOGY
End: 2021-07-23

## 2021-07-23 DIAGNOSIS — Z41.9 ENCOUNTER FOR PROCEDURE FOR PURPOSES OTHER THAN REMEDYING HEALTH STATE, UNSPECIFIED: ICD-10-CM

## 2021-07-23 PROCEDURE — ? BOTOX

## 2021-07-23 NOTE — PROCEDURE: BOTOX
Left Pupillary Line Units: 0
Price (Use Numbers Only, No Special Characters Or $): 1000
Lot #: [id number]
Show Additional Area 6: Yes
Detail Level: Zone
Dilution (U/0.1 Cc): 0.2
Show Ucl Units: No
Consent: Written consent obtained. Risks include but not limited to lid/brow ptosis, bruising, swelling, diplopia, temporary effect, incomplete chemical denervation.
Additional Area 1 Location: face
Post-Care Instructions: Patient instructed to not lie down for 4 hours and limit physical activity for 24 hours.
Additional Area 1 Units: 100
Expiration Date (Month Year): 10/23

## 2021-09-13 ENCOUNTER — APPOINTMENT (RX ONLY)
Dept: URBAN - METROPOLITAN AREA CLINIC 173 | Facility: CLINIC | Age: 56
Setting detail: DERMATOLOGY
End: 2021-09-13

## 2021-09-13 DIAGNOSIS — Z41.9 ENCOUNTER FOR PROCEDURE FOR PURPOSES OTHER THAN REMEDYING HEALTH STATE, UNSPECIFIED: ICD-10-CM

## 2021-09-13 PROCEDURE — ? FRAXEL

## 2021-09-13 ASSESSMENT — LOCATION SIMPLE DESCRIPTION DERM
LOCATION SIMPLE: LEFT ANTERIOR NECK
LOCATION SIMPLE: CHEST

## 2021-09-13 ASSESSMENT — LOCATION ZONE DERM
LOCATION ZONE: TRUNK
LOCATION ZONE: NECK

## 2021-09-13 ASSESSMENT — LOCATION DETAILED DESCRIPTION DERM
LOCATION DETAILED: MIDDLE STERNUM
LOCATION DETAILED: LEFT INFERIOR ANTERIOR NECK

## 2021-09-13 NOTE — HPI: COSMETIC (LASER RESURFACING)
Have You Had Laser Resurfacing Before?: has had previous treatments
When Was Your Last Laser Resurfacing Treatment?: Years ago

## 2021-09-13 NOTE — PROCEDURE: FRAXEL
Treatment Level: 6
Energy(Mj/Cm2): 1
Total Coverage: 23%
Location: neck
Energy(Mj/Cm2): 40
Number Of Passes: 8
Indication: resurfacing
Consent: Written consent obtained, risks reviewed including but not limited to pain and incomplete improvement.
Wavelength: 1550nm
Energy(Mj/Cm2): 20
Number Of Passes: 4
Add Post-Care Below To The Note: No
Length Of Topical Anesthesia Application (Optional): 90 minutes
Total Coverage: 15%
Anesthesia Volume In Cc: 0
Energy(Mj/Cm2): 30
Energy(Mj/Cm2): 30
External Cooling: Mumtaz Cryo 6
Post-Care Instructions: I reviewed with the patient in detail post-care instructions. Patient should avoid sun until area fully healed.
Total Energy In Kj (Optional- Don't Include Units): 6.38
Treatment Level: 7
Energy(Mj/Cm2): 20
Detail Level: Zone
Location: decolletage of the chest
Topical Anesthesia Type: 23% lidocaine, 7% tetracaine
Price (Use Numbers Only, No Special Characters Or $): 1100

## 2021-09-24 ENCOUNTER — APPOINTMENT (RX ONLY)
Dept: URBAN - METROPOLITAN AREA CLINIC 173 | Facility: CLINIC | Age: 56
Setting detail: DERMATOLOGY
End: 2021-09-24

## 2021-09-24 DIAGNOSIS — Z41.9 ENCOUNTER FOR PROCEDURE FOR PURPOSES OTHER THAN REMEDYING HEALTH STATE, UNSPECIFIED: ICD-10-CM

## 2021-09-24 PROCEDURE — ? PIXEL8

## 2021-09-24 PROCEDURE — ? SECRET RF

## 2021-09-24 NOTE — PROCEDURE: SECRET RF
Tip: 24-Insulated
Depth In Microns: 0.5
Passes: 0
Additional Treatment Area: periorbital
Rf Duration (Include Units If Applicable): 300ms
Detail Level: Zone
Consent: Written consent obtained, risks reviewed including but not limited to darker or lighter pigmentary change, and/or incomplete improvement.
Depth In Microns: 1
Rf Duration (Include Units If Applicable): 250
Intensity (Include Units If Applicable): 7/7/6
Passes: 2
Tip: 64-Insulated
Indication: Skin Laxity
Additional Treatment Area: mid-lower face
Post-Care Instructions: I reviewed with the patient in detail post-care instructions. Alastin skin nectar applied post, apply at home x 1-2 days.
Intensity (Include Units If Applicable): 6
Passes: 3
Rf Duration (Include Units If Applicable): 300
Rf Duration (Include Units If Applicable): 250/200
Rf Duration (Include Units If Applicable): 200ms
Rf Duration (Include Units If Applicable): 300
Rf Duration (Include Units If Applicable): 250
Intensity (Include Units If Applicable): 5
Render Post-Care In The Note: Yes
Intensity (Include Units If Applicable): 7
Tip: 24-Noninsulated
Depth In Microns: 1.5
Use Distraction Techniques In Note: No
Treatment Area: forehead
Intensity (Include Units If Applicable): 7/6/6

## 2021-09-24 NOTE — PROCEDURE: PIXEL8
Tip: 49 pin Non-Insulated
Price (Use Numbers Only, No Special Characters Or $): 200
Passes: 0
Intensity: 7/7/6
Intensity: 7/6/6
Use Distraction Techniques In Note: No
Passes: 3
Post-Care Instructions: I reviewed with the patient in detail post-care instructions. Patient should apply moisturizer until fully healed.
Detail Level: Zone
Rf Duration (Include Units If Applicable): 200
Consent: Written consent obtained, risks reviewed including but not limited to darker or lighter pigmentary change, and/or incomplete improvement.
Rf Duration (Include Units If Applicable): 300
Render Post-Care In The Note: Yes
Delay (Include Units If Applicable): 300 ms
Intensity: 6/5/5
Rf Duration (Include Units If Applicable): 300

## 2021-10-06 ENCOUNTER — APPOINTMENT (RX ONLY)
Dept: URBAN - METROPOLITAN AREA CLINIC 173 | Facility: CLINIC | Age: 56
Setting detail: DERMATOLOGY
End: 2021-10-06

## 2021-10-06 DIAGNOSIS — Z41.9 ENCOUNTER FOR PROCEDURE FOR PURPOSES OTHER THAN REMEDYING HEALTH STATE, UNSPECIFIED: ICD-10-CM

## 2021-10-06 PROCEDURE — ? BOTOX

## 2021-10-06 NOTE — PROCEDURE: BOTOX
Anterior Platysmal Bands Units: 0
Price (Use Numbers Only, No Special Characters Or $): 1000
Show Additional Area 2: Yes
Lot #: [id number]
Forehead Units: 8
Additional Area 2 Units: 4
Consent: Written consent obtained. Risks include but not limited to lid/brow ptosis, bruising, swelling, diplopia, temporary effect, incomplete chemical denervation.
Show Ucl Units: No
Dilution (U/0.1 Cc): 5
Glabellar Complex Units: 15
Additional Area 3 Location: Platysmal Bands
Additional Area 1 Location: Obicularis
Post-Care Instructions: Patient instructed to not lie down for 4 hours and limit physical activity for 24 hours. Patient instructed not to travel by airplane for 48 hours.
Additional Area 3 Units: 56
Additional Area 1 Units: 20
Expiration Date (Month Year): 01/2024
Detail Level: Detailed
Additional Area 2 Location: Lips

## 2021-10-25 ENCOUNTER — RX ONLY (OUTPATIENT)
Age: 56
Setting detail: RX ONLY
End: 2021-10-25

## 2021-10-25 ENCOUNTER — APPOINTMENT (RX ONLY)
Dept: URBAN - METROPOLITAN AREA CLINIC 170 | Facility: CLINIC | Age: 56
Setting detail: DERMATOLOGY
End: 2021-10-25

## 2021-10-25 ENCOUNTER — APPOINTMENT (RX ONLY)
Dept: URBAN - METROPOLITAN AREA CLINIC 173 | Facility: CLINIC | Age: 56
Setting detail: DERMATOLOGY
End: 2021-10-25

## 2021-10-25 DIAGNOSIS — Z41.9 ENCOUNTER FOR PROCEDURE FOR PURPOSES OTHER THAN REMEDYING HEALTH STATE, UNSPECIFIED: ICD-10-CM

## 2021-10-25 PROCEDURE — ? FRAXEL

## 2021-10-25 RX ORDER — LIDOCAINE AND PRILOCAINE 25; 25 MG/G; MG/G
CREAM TOPICAL
Qty: 30 | Refills: 1 | Status: ERX

## 2021-10-25 ASSESSMENT — LOCATION DETAILED DESCRIPTION DERM
LOCATION DETAILED: MIDDLE STERNUM
LOCATION DETAILED: LEFT INFERIOR ANTERIOR NECK
LOCATION DETAILED: LEFT INFERIOR ANTERIOR NECK
LOCATION DETAILED: MIDDLE STERNUM

## 2021-10-25 ASSESSMENT — LOCATION ZONE DERM
LOCATION ZONE: NECK
LOCATION ZONE: NECK
LOCATION ZONE: TRUNK
LOCATION ZONE: TRUNK

## 2021-10-25 ASSESSMENT — LOCATION SIMPLE DESCRIPTION DERM
LOCATION SIMPLE: LEFT ANTERIOR NECK
LOCATION SIMPLE: CHEST
LOCATION SIMPLE: LEFT ANTERIOR NECK
LOCATION SIMPLE: CHEST

## 2021-10-25 NOTE — HPI: COSMETIC (LASER RESURFACING)
Have You Had Laser Resurfacing Before?: has had previous treatments
When Was Your Last Laser Resurfacing Treatment?: 9/13/21

## 2021-10-25 NOTE — PROCEDURE: FRAXEL
Treatment Level: 6
Energy(Mj/Cm2): 1
Total Coverage: 23%
Location: neck
Energy(Mj/Cm2): 40
Number Of Passes: 8
Indication: resurfacing
Consent: Written consent obtained, risks reviewed including but not limited to pain and incomplete improvement.
Wavelength: 1550nm
Energy(Mj/Cm2): 30
Number Of Passes: 4
Add Post-Care Below To The Note: No
Length Of Topical Anesthesia Application (Optional): 90 minutes
Total Coverage: 15%
Anesthesia Volume In Cc: 0
Energy(Mj/Cm2): 30
External Cooling: Mumtaz Cryo 6
Post-Care Instructions: I reviewed with the patient in detail post-care instructions. Patient should avoid sun until area fully healed.
Total Energy In Kj (Optional- Don't Include Units): 6.64
Treatment Level: 7
Detail Level: Zone
Treatment Number: 2
Location: decolletage of the chest
Topical Anesthesia Type: 23% lidocaine, 7% tetracaine
Price (Use Numbers Only, No Special Characters Or $): 1100

## 2021-10-25 NOTE — PROCEDURE: FRAXEL
Treatment Level: 6
Energy(Mj/Cm2): 1
Total Coverage: 23%
Location: neck
Energy(Mj/Cm2): 40
Number Of Passes: 8
Indication: resurfacing
Consent: Written consent obtained, risks reviewed including but not limited to pain and incomplete improvement.
Wavelength: 1550nm
Energy(Mj/Cm2): 20
Number Of Passes: 4
Add Post-Care Below To The Note: No
Length Of Topical Anesthesia Application (Optional): 60 minutes
Total Coverage: 15%
Anesthesia Volume In Cc: 0
Energy(Mj/Cm2): 30
Energy(Mj/Cm2): 30
External Cooling: Mumtaz Cryo 6
Post-Care Instructions: I reviewed with the patient in detail post-care instructions. Patient should avoid sun until area fully healed.
Total Energy In Kj (Optional- Don't Include Units): 6.38
Treatment Level: 7
Energy(Mj/Cm2): 20
Detail Level: Zone
Treatment Number: 2
Location: decolletage of the chest
Topical Anesthesia Type: 23% lidocaine, 7% tetracaine
Price (Use Numbers Only, No Special Characters Or $): 1100

## 2021-10-26 NOTE — PROCEDURE: BOTOX
Depressor Anguli Oris Units: 0
Show Ucl Units: No
Lot #: [id number]
Show Orbicularis Oculi Units: Yes
Detail Level: Zone
Dilution (U/0.1 Cc): 4
Consent: Written consent obtained. Risks include but not limited to lid/brow ptosis, bruising, swelling, diplopia, temporary effect, incomplete chemical denervation.
Additional Area 1 Location: Face
Expiration Date (Month Year): 10/23
Additional Area 1 Units: 45
Photo Preface (Leave Blank If You Do Not Want): Photographs were obtained today
Detail Level: Zone

## 2021-11-29 ENCOUNTER — RX ONLY (OUTPATIENT)
Age: 56
Setting detail: RX ONLY
End: 2021-11-29

## 2021-11-29 RX ORDER — LIDOCAINE AND PRILOCAINE 25; 25 MG/G; MG/G
CREAM TOPICAL
Qty: 30 | Refills: 3 | Status: ERX

## 2021-11-30 ENCOUNTER — APPOINTMENT (RX ONLY)
Dept: URBAN - METROPOLITAN AREA CLINIC 173 | Facility: CLINIC | Age: 56
Setting detail: DERMATOLOGY
End: 2021-11-30

## 2021-11-30 DIAGNOSIS — Z41.9 ENCOUNTER FOR PROCEDURE FOR PURPOSES OTHER THAN REMEDYING HEALTH STATE, UNSPECIFIED: ICD-10-CM

## 2021-11-30 PROCEDURE — ? FILLERS

## 2021-11-30 PROCEDURE — ? BOTOX

## 2021-11-30 NOTE — PROCEDURE: FILLERS
Nasolabial Folds Filler Volume In Cc: 0
Map Statment: See Attach Map for Complete Details
Include Cannula Information In Note?: No
Filler: RHA 3
Lot #: [id number]*mpc special*
Expiration Date (Month Year): 3/24
Include Cannula Information In Note?: Yes
Anesthesia Type: 1% lidocaine with epinephrine 1:100,000 buffered with 8.4% sodium bicarbonate (1:9 ratio)
Anesthesia Volume In Cc: 0.2
Lot #: [id number]
Lot #: [id number]
Include Cannula Size?: 25G
Additional Area 1 Location: Face
Consent: Written consent obtained. Risks include but not limited to bruising, beading, irregular texture, ulceration, infection, allergic reaction, scar formation, incomplete augmentation, temporary nature, procedural pain.
Expiration Date (Month Year): 5/15/16
Post-Care Instructions: Patient instructed to apply ice to reduce swelling.
Include Cannula Length?: 1.5 inch
Additional Area 1 Volume In Cc: 1
Detail Level: Zone
Price (Use Numbers Only, No Special Characters Or $): 4152

## 2021-11-30 NOTE — PROCEDURE: BOTOX
Show Additional Area 3: Yes
Right Pupillary Line Units: 0
Additional Area 1 Units: 34
Show Lcl Units: No
Detail Level: Zone
Additional Area 2 Location: [facility]
Expiration Date (Month Year): 10/23
Price (Use Numbers Only, No Special Characters Or $): 510
Lot #: [id number]
Consent: Written consent obtained. Risks include but not limited to lid/brow ptosis, bruising, swelling, diplopia, temporary effect, incomplete chemical denervation.
Dilution (U/0.1 Cc): 0.2
Post-Care Instructions: Patient instructed to not lie down for 4 hours and limit physical activity for 24 hours.
Additional Area 1 Location: face

## 2021-12-14 ENCOUNTER — APPOINTMENT (RX ONLY)
Dept: URBAN - METROPOLITAN AREA CLINIC 173 | Facility: CLINIC | Age: 56
Setting detail: DERMATOLOGY
End: 2021-12-14

## 2021-12-14 DIAGNOSIS — Z41.9 ENCOUNTER FOR PROCEDURE FOR PURPOSES OTHER THAN REMEDYING HEALTH STATE, UNSPECIFIED: ICD-10-CM

## 2021-12-14 PROCEDURE — ? PATIENT SPECIFIC COUNSELING

## 2021-12-14 PROCEDURE — ? FILLERS

## 2021-12-14 NOTE — PROCEDURE: FILLERS
[place] Filler  Volume In Cc: 0
Include Cannula Size?: 25G
Expiration Date (Month Year): 3/31/2022
Additional Area 1 Location: Face
Additional Area 1 Volume In Cc: 1
Include Cannula Information In Note?: No
Include Cannula Length?: 1.5 inch
Consent: Written consent obtained. Risks include but not limited to bruising, beading, irregular texture, ulceration, infection, allergic reaction, scar formation, incomplete augmentation, temporary nature, procedural pain.
Detail Level: Zone
Filler: RHA 2
Post-Care Instructions: Patient instructed to apply ice to reduce swelling.
Price (Use Numbers Only, No Special Characters Or $): 173
Lot #: [id number]
Use Map Statement For Sites (Optional): Yes
Expiration Date (Month Year): 5/15/16
Map Statment: See Attach Map for Complete Details
Lot #: [id number]**mpcspecial**
Expiration Date (Month Year): 2/22/24
Anesthesia Type: 1% lidocaine with epinephrine 1:100,000 buffered with 8.4% sodium bicarbonate (1:9 ratio)
Anesthesia Volume In Cc: 0.2
Lot #: [id number]

## 2021-12-14 NOTE — PROCEDURE: PATIENT SPECIFIC COUNSELING
Discussed WIQO for face and neck, $1500 for 4 sessions or face neck and chest $2000 for 4 sessions. Reviewed and gave patient brochure.
Detail Level: Zone

## 2022-01-05 ENCOUNTER — APPOINTMENT (RX ONLY)
Dept: URBAN - METROPOLITAN AREA CLINIC 173 | Facility: CLINIC | Age: 57
Setting detail: DERMATOLOGY
End: 2022-01-05

## 2022-01-05 DIAGNOSIS — Z41.9 ENCOUNTER FOR PROCEDURE FOR PURPOSES OTHER THAN REMEDYING HEALTH STATE, UNSPECIFIED: ICD-10-CM

## 2022-01-05 PROCEDURE — ? WIQO PEEL

## 2022-01-05 ASSESSMENT — LOCATION DETAILED DESCRIPTION DERM: LOCATION DETAILED: LEFT CENTRAL MALAR CHEEK

## 2022-01-05 ASSESSMENT — LOCATION SIMPLE DESCRIPTION DERM: LOCATION SIMPLE: LEFT CHEEK

## 2022-01-05 ASSESSMENT — LOCATION ZONE DERM: LOCATION ZONE: FACE

## 2022-01-05 NOTE — PROCEDURE: WIQO PEEL
Consent: Prior to the procedure, written consent was obtained and risks were reviewed including but not limited to: redness, peeling, blistering, pigmentary change, scarring, infection, and pain.
Treatment Number: 1
Peel: The WiQo Peel PRX-T33 was applied.
Price (Use Numbers Only, No Special Characters Or $): 2000.00
Post Peel Care: The WiQo moisturizing face cream for dry skin was applied after the PRX-T33 application.
Prep: The treated site was cleansed with WiQo P solution.
Detail Level: Zone
Post-Care Instructions: I reviewed with the patient in detail post-care instructions. Patient should apply WiQo facial smoothing fluid for all skin types starting the day after the PRX-T33 application.
Chemical Peel: WiQo PRX-T33

## 2022-01-12 ENCOUNTER — APPOINTMENT (RX ONLY)
Dept: URBAN - METROPOLITAN AREA CLINIC 173 | Facility: CLINIC | Age: 57
Setting detail: DERMATOLOGY
End: 2022-01-12

## 2022-01-12 DIAGNOSIS — Z41.9 ENCOUNTER FOR PROCEDURE FOR PURPOSES OTHER THAN REMEDYING HEALTH STATE, UNSPECIFIED: ICD-10-CM

## 2022-01-12 PROCEDURE — ? BOTOX

## 2022-01-12 PROCEDURE — ? PATIENT SPECIFIC COUNSELING

## 2022-01-12 NOTE — PROCEDURE: BOTOX
Right Periorbital Units: 0
Show Depressor Anguli Units: Yes
Show Right And Left Brow Units: No
Lot #: [id number]
Price (Use Numbers Only, No Special Characters Or $): 1000.0
Additional Area 1 Location: face
Expiration Date (Month Year): 4/24
Dilution (U/0.1 Cc): 0.2
Additional Area 1 Units: 100
Consent: Written consent obtained. Risks include but not limited to lid/brow ptosis, bruising, swelling, diplopia, temporary effect, incomplete chemical denervation.
Additional Area 2 Location: [facility]
Post-Care Instructions: Patient instructed to not lie down for 4 hours and limit physical activity for 24 hours.
Detail Level: Zone

## 2022-01-19 ENCOUNTER — APPOINTMENT (RX ONLY)
Dept: URBAN - METROPOLITAN AREA CLINIC 173 | Facility: CLINIC | Age: 57
Setting detail: DERMATOLOGY
End: 2022-01-19

## 2022-01-19 DIAGNOSIS — Z41.9 ENCOUNTER FOR PROCEDURE FOR PURPOSES OTHER THAN REMEDYING HEALTH STATE, UNSPECIFIED: ICD-10-CM

## 2022-01-19 PROCEDURE — ? WIQO PEEL

## 2022-01-19 NOTE — PROCEDURE: WIQO PEEL
Prep: The treated site was cleansed with WiQo P solution.
Detail Level: Zone
Treatment Number: 2
Post-Care Instructions: I reviewed with the patient in detail post-care instructions. Patient should apply WiQo facial smoothing fluid for all skin types starting the day after the PRX-T33 application.
Peel: The WiQo Peel PRX-T33 was applied.
Chemical Peel: WiQo PRX-T33
Consent: Prior to the procedure, written consent was obtained and risks were reviewed including but not limited to: redness, peeling, blistering, pigmentary change, scarring, infection, and pain.
Post Peel Care: The WiQo moisturizing face cream for dry skin was applied after the PRX-T33 application.

## 2022-01-27 ENCOUNTER — APPOINTMENT (RX ONLY)
Dept: URBAN - METROPOLITAN AREA CLINIC 173 | Facility: CLINIC | Age: 57
Setting detail: DERMATOLOGY
End: 2022-01-27

## 2022-01-27 DIAGNOSIS — Z41.9 ENCOUNTER FOR PROCEDURE FOR PURPOSES OTHER THAN REMEDYING HEALTH STATE, UNSPECIFIED: ICD-10-CM

## 2022-01-27 PROCEDURE — ? WIQO PEEL

## 2022-01-27 NOTE — PROCEDURE: WIQO PEEL
Chemical Peel: WiQo PRX-T33
Treatment Number: 3
Post Peel Care: The WiQo moisturizing face cream for dry skin was applied after the PRX-T33 application.
Consent: Prior to the procedure, written consent was obtained and risks were reviewed including but not limited to: redness, peeling, blistering, pigmentary change, scarring, infection, and pain.
Post-Care Instructions: I reviewed with the patient in detail post-care instructions. Patient should apply WiQo facial smoothing fluid for all skin types starting the day after the PRX-T33 application.
Prep: The treated site was cleansed with WiQo P solution.
Detail Level: Zone
Peel: The WiQo Peel PRX-T33 was applied.
Price (Use Numbers Only, No Special Characters Or $): 500

## 2022-02-02 ENCOUNTER — APPOINTMENT (RX ONLY)
Dept: URBAN - METROPOLITAN AREA CLINIC 173 | Facility: CLINIC | Age: 57
Setting detail: DERMATOLOGY
End: 2022-02-02

## 2022-02-02 DIAGNOSIS — Z41.9 ENCOUNTER FOR PROCEDURE FOR PURPOSES OTHER THAN REMEDYING HEALTH STATE, UNSPECIFIED: ICD-10-CM

## 2022-02-02 PROCEDURE — ? WIQO PEEL

## 2022-02-02 NOTE — PROCEDURE: WIQO PEEL
Consent: Prior to the procedure, written consent was obtained and risks were reviewed including but not limited to: redness, peeling, blistering, pigmentary change, scarring, infection, and pain.
Prep: The treated site was cleansed with WiQo P solution.
Price (Use Numbers Only, No Special Characters Or $): 500
Post Peel Care: The WiQo moisturizing face cream for dry skin was applied after the PRX-T33 application.
Post-Care Instructions: I reviewed with the patient in detail post-care instructions. Patient should apply WiQo facial smoothing fluid for all skin types starting the day after the PRX-T33 application.
Peel: The WiQo Peel PRX-T33 was applied.
Treatment Number: 4
Detail Level: Zone
Chemical Peel: WiQo PRX-T33

## 2022-02-21 ENCOUNTER — APPOINTMENT (RX ONLY)
Dept: URBAN - METROPOLITAN AREA CLINIC 173 | Facility: CLINIC | Age: 57
Setting detail: DERMATOLOGY
End: 2022-02-21

## 2022-02-21 DIAGNOSIS — Z41.9 ENCOUNTER FOR PROCEDURE FOR PURPOSES OTHER THAN REMEDYING HEALTH STATE, UNSPECIFIED: ICD-10-CM

## 2022-02-21 PROCEDURE — ? FILLERS

## 2022-02-21 NOTE — PROCEDURE: FILLERS
Cheeks Filler Volume In Cc: 0
Consent: Written consent obtained. Risks include but not limited to bruising, beading, irregular texture, ulceration, infection, allergic reaction, scar formation, incomplete augmentation, temporary nature, procedural pain.
Post-Care Instructions: Patient instructed to apply ice to reduce swelling.
Expiration Date (Month Year): 12/19
Use Map Statement For Sites (Optional): No
Expiration Date (Month Year): 01/20
Map Statment: See Attach Map for Complete Details
Lot #: [id number] *JKS SPECIAL*
Lot #: [id number] *KIRT Special *
Expiration Date (Month Year): 06/25/24
Anesthesia Type: 1% lidocaine without epinephrine
Additional Area 1 Location: Face
Anesthesia Volume In Cc: 0.5
Additional Anesthesia Volume In Cc: 6
Additional Area 1 Volume In Cc: 0.1
Detail Level: Zone
Filler: RHA 2
Lot #: [id number]

## 2022-03-31 ENCOUNTER — APPOINTMENT (RX ONLY)
Dept: URBAN - METROPOLITAN AREA CLINIC 173 | Facility: CLINIC | Age: 57
Setting detail: DERMATOLOGY
End: 2022-03-31

## 2022-03-31 DIAGNOSIS — Z41.9 ENCOUNTER FOR PROCEDURE FOR PURPOSES OTHER THAN REMEDYING HEALTH STATE, UNSPECIFIED: ICD-10-CM

## 2022-03-31 PROCEDURE — ? BOTOX

## 2022-03-31 NOTE — PROCEDURE: BOTOX
Right Periorbital Units: 0
Show Depressor Anguli Units: Yes
Show Right And Left Brow Units: No
Lot #: [id number]
Price (Use Numbers Only, No Special Characters Or $): 1000.0
Additional Area 1 Location: face
Expiration Date (Month Year): 5/24
Dilution (U/0.1 Cc): 0.2
Additional Area 1 Units: 100
Consent: Written consent obtained. Risks include but not limited to lid/brow ptosis, bruising, swelling, diplopia, temporary effect, incomplete chemical denervation.
Additional Area 2 Location: [facility]
Post-Care Instructions: Patient instructed to not lie down for 4 hours and limit physical activity for 24 hours.
Detail Level: Zone

## 2022-05-05 ENCOUNTER — APPOINTMENT (RX ONLY)
Dept: URBAN - METROPOLITAN AREA CLINIC 173 | Facility: CLINIC | Age: 57
Setting detail: DERMATOLOGY
End: 2022-05-05

## 2022-05-05 DIAGNOSIS — L82.1 OTHER SEBORRHEIC KERATOSIS: ICD-10-CM

## 2022-05-05 DIAGNOSIS — Z41.9 ENCOUNTER FOR PROCEDURE FOR PURPOSES OTHER THAN REMEDYING HEALTH STATE, UNSPECIFIED: ICD-10-CM

## 2022-05-05 PROCEDURE — ? TREATMENT REGIMEN

## 2022-05-05 PROCEDURE — ? PULSED-DYE LASER

## 2022-05-05 PROCEDURE — ? LIQUID NITROGEN (COSMETIC)

## 2022-05-05 PROCEDURE — ? PRESCRIPTION

## 2022-05-05 PROCEDURE — ? DIAGNOSIS COMMENT

## 2022-05-05 RX ORDER — PHARMACY COMPOUNDING ACCESSORY
G EACH MISCELLANEOUS NIGHTLY
Qty: 30 | Refills: 1 | Status: ERX

## 2022-05-05 ASSESSMENT — LOCATION ZONE DERM: LOCATION ZONE: FACE

## 2022-05-05 ASSESSMENT — LOCATION SIMPLE DESCRIPTION DERM: LOCATION SIMPLE: LEFT CHEEK

## 2022-05-05 ASSESSMENT — LOCATION DETAILED DESCRIPTION DERM: LOCATION DETAILED: LEFT INFERIOR CENTRAL MALAR CHEEK

## 2022-05-05 NOTE — PROCEDURE: LIQUID NITROGEN (COSMETIC)
Spray Paint Text: The liquid nitrogen was applied to the skin utilizing a spray paint frosting technique.
Spray Paint Technique: No
Show Spray Paint Technique Variable?: Yes
Billing Information: Bill by Static Price
Consent: The patient's consent was obtained including but not limited to risks of crusting, scabbing, blistering, scarring, darker or lighter pigmentary change, recurrence, incomplete removal and infection. The patient understands that the procedure is cosmetic in nature and is not covered by insurance.
Detail Level: Zone
Post-Care Instructions: I reviewed with the patient in detail post-care instructions. Patient is to wear sunprotection, and avoid picking at any of the treated lesions. Pt may apply Vaseline to crusted or scabbing areas.

## 2022-05-05 NOTE — HPI: COSMETIC (REMOVAL OF LESIONS)
Have You Had This Cosmetic Procedure Done Before?: has not had previous treatment no diarrhea/no abdominal pain

## 2022-05-05 NOTE — PROCEDURE: TREATMENT REGIMEN
Initiate Treatment: Start hydroquinone 8%, tretinoin 0.025%, kojic acid 1%, niacinamide 4%, fluocinolone 0.025% cream in W06 base.  Apply a thin layer nightly for 3 months.
Detail Level: Zone

## 2022-05-05 NOTE — PROCEDURE: PULSED-DYE LASER
Laser Type: Vbeam 595nm
Delay Time (Ms): 20
Fluence In J/Cm2 (Optional): 7.75
Delay Time (Ms): 20
Pulse Count (Optional): 339
Fluence In J/Cm2 (Optional): 7.75
Immediate Endpoint: erythema
Post-Procedure Care: Ice applied after treatment. Post care reviewed with patient.
Pulse Duration: 6 ms
Spot Size: 7 mm
Cryogen Time (Ms): 30
Price (Use Numbers Only, No Special Characters Or $): 772
Spot Size: 10 mm
Detail Level: Zone
Pulse Count (Optional): 58
Location Override: scars: abdomen and knee cap
Cryogen Time (Ms): 30
Pulse Count (Optional): 190
Location Override: chest and neck
Consent: Written consent obtained, risks reviewed including but not limited to crusting, scabbing, blistering, scarring, darker or lighter pigmentary change, incidental hair removal, bruising, and/or incomplete removal.
Pulse Duration: 10 ms
Post-Care Instructions: I reviewed with the patient in detail post-care instructions. Patient should stay away from the sun and wear sun protection until treated areas are fully healed.
Location Override: face

## 2022-05-05 NOTE — PROCEDURE: DIAGNOSIS COMMENT
Render Risk Assessment In Note?: no
Comment: Subtle hyperpigmentation/erythema on upper vermilion border.  Treated with PDL today and will start compounded hydroquinone cream nightly for 3 months.
Detail Level: Simple
Comment: Has tried other lasers in the past with minimal improvement in erythema. Will try PDL.  Discussed multiple treatments needed. Blow dryer used.

## 2022-05-09 ENCOUNTER — RX ONLY (OUTPATIENT)
Age: 57
Setting detail: RX ONLY
End: 2022-05-09

## 2022-05-09 RX ORDER — PREDNISONE 10 MG/1
TABLET ORAL
Qty: 10 | Refills: 0 | Status: ERX | COMMUNITY
Start: 2022-05-09

## 2022-06-08 ENCOUNTER — APPOINTMENT (RX ONLY)
Dept: URBAN - METROPOLITAN AREA CLINIC 173 | Facility: CLINIC | Age: 57
Setting detail: DERMATOLOGY
End: 2022-06-08

## 2022-06-08 DIAGNOSIS — Z41.9 ENCOUNTER FOR PROCEDURE FOR PURPOSES OTHER THAN REMEDYING HEALTH STATE, UNSPECIFIED: ICD-10-CM

## 2022-06-08 PROCEDURE — ? OTHER (COSMETIC)

## 2022-06-08 PROCEDURE — ? PRESCRIPTION

## 2022-06-08 PROCEDURE — ? TREATMENT REGIMEN

## 2022-06-08 PROCEDURE — ? COSMETIC CONSULTATION: BOTULINUM TOXIN

## 2022-06-08 PROCEDURE — ? DYSPORT

## 2022-06-08 PROCEDURE — ? COSMETIC CONSULTATION: FILLERS

## 2022-06-08 PROCEDURE — ? BOTOX

## 2022-06-08 RX ORDER — LIDOCAINE AND PRILOCAINE 25; 25 MG/G; MG/G
CREAM TOPICAL
Qty: 30 | Refills: 3 | Status: ERX | COMMUNITY
Start: 2022-06-08

## 2022-06-08 RX ORDER — PHARMACY COMPOUNDING ACCESSORY
EACH MISCELLANEOUS
Qty: 30 | Refills: 3 | Status: ERX | COMMUNITY
Start: 2022-06-08

## 2022-06-08 RX ADMIN — Medication: at 00:00

## 2022-06-08 RX ADMIN — LIDOCAINE AND PRILOCAINE: 25; 25 CREAM TOPICAL at 00:00

## 2022-06-08 NOTE — PROCEDURE: DYSPORT
Pulmonary Consult Note      Reason for Consult: COPD exacerbation   Requesting Physician: Teresa    Subjective:     CHIEF COMPLAINT / HPI:                The patient is a 78 y.o. male with significant past medical history of COPD, CKD  presented with complaints of shortness of breath while getting pulmonary function testing and a 6 minute walk. Patient is a former smoker and new patient in Dr. Valeria Barrera pulmonary office. He was sent for baseline studies because of chronic shortness of breath. Patient feels he had an adverse reaction to the nebulizer given as part of the PFTs in that it made him feel more short of breath, jittery and gave him diarrhea. In his attempt to get to the bathroom he became more short of breath and had to go to the ED. There he was found to be hypoxemic and admitted for further workup. He feels much better today. Past Medical History:      Diagnosis Date    Anemia     Asthma     Chronic kidney disease     COPD (chronic obstructive pulmonary disease) (HCC)     severe    Essential hypertension     Hyperlipidemia       Past Surgical History:        Procedure Laterality Date    CARDIAC CATHETERIZATION  11/2013    Dr. Nicanor Palma - [facility]     HERNIA REPAIR  2011    Right inguinal hernia repair    UPPER GASTROINTESTINAL ENDOSCOPY  06/23/2017    Dr. Addie Hirsch, hiatal hernia, mid gastric stricture. Current Medications:     azithromycin  250 mg Oral Daily    guaiFENesin  600 mg Oral Daily    NIFEdipine  90 mg Oral Daily    predniSONE  40 mg Oral Daily    tiotropium  1 puff Inhalation Daily    sodium chloride flush  10 mL Intravenous 2 times per day    enoxaparin  40 mg Subcutaneous Daily    atorvastatin  40 mg Oral Nightly    albuterol sulfate HFA  2 puff Inhalation 6 times per day     Allergies: Allergies   Allergen Reactions    Codeine Hives     Social History:    TOBACCO:   reports that he quit smoking about 4 years ago. He has a 22.50 pack-year smoking history.
He has never used smokeless tobacco.  ETOH:   reports no history of alcohol use. Family History:       Problem Relation Age of Onset    Other Mother         back pain, cva    Other Father          young-heart attack    Other Brother         Healthy     Other Sister     Other Sister         Healthy        REVIEW OF SYSTEMS:    CONSTITUTIONAL:  negative for fevers, chills, diaphoresis, activity change, appetite change, fatigue, night sweats and unexpected weight change. EYES:  negative for blurred vision, eye discharge, visual disturbance and icterus  HEENT:  negative for hearing loss, tinnitus, ear drainage, sinus pressure, nasal congestion, epistaxis and snoring  RESPIRATORY:  See HPI  CARDIOVASCULAR:  negative for chest pain, palpitations, exertional chest pressure/discomfort, edema, syncope  GASTROINTESTINAL:  negative for nausea, vomiting, diarrhea, constipation, blood in stool and abdominal pain  GENITOURINARY:  negative for frequency, dysuria, urinary incontinence, decreased urine volume, and hematuria  HEMATOLOGIC/LYMPHATIC:  negative for easy bruising, bleeding and lymphadenopathy  ALLERGIC/IMMUNOLOGIC:  negative for recurrent infections, angioedema, anaphylaxis and drug reactions  ENDOCRINE:  negative for weight changes and diabetic symptoms including polyuria, polydipsia and polyphagia  MUSCULOSKELETAL:  negative for  pain, joint swelling, decreased range of motion and muscle weakness  NEUROLOGICAL:  negative for headaches, slurred speech, unilateral weakness  PSYCHIATRIC/BEHAVIORAL: negative for hallucinations, behavioral problems, confusion and agitation.      Objective:   PHYSICAL EXAM:      VITALS:  BP (!) 136/95   Pulse 74   Temp 96.6 °F (35.9 °C) (Oral)   Resp 20   Ht 5' 7\" (1.702 m)   Wt 140 lb (63.5 kg)   SpO2 98%   BMI 21.93 kg/m²   24HR INTAKE/OUTPUT:      Intake/Output Summary (Last 24 hours) at 2020 1215  Last data filed at 2020 0503  Gross per 24 hour   Intake 480
Show Periorbital Units: Yes
Depressor Anguli Oris Units: 0
Show Ucl Units: No
Additional Area 3 Location: Perioral
Consent: Written consent obtained. Risks include but not limited to lid/brow ptosis, bruising, swelling, diplopia, temporary effect, incomplete chemical denervation.
Dilution (U/ 0.1cc): 10
Additional Area 1 Location: face and neck bands
Post-Care Instructions: Patient instructed to not lie down for 4 hours and limit physical activity for 24 hours. Patient instructed not to travel by airplane for 48 hours.
Additional Area 1 Units: 64
Expiration Date (Month Year): 12/22
Additional Area 2 Location: neck
Detail Level: Zone
Price (Use Numbers Only, No Special Characters Or $): 413
Lot #: [id number]

## 2022-06-08 NOTE — PROCEDURE: BOTOX
Mentalis Units: 0
Additional Area 2 Location: Upper cutaneous lip
Show Additional Area 5: Yes
Detail Level: Zone
Show Right And Left Brow Units: No
Lot #: [id number]
Expiration Date (Month Year): 06/24
Price (Use Numbers Only, No Special Characters Or $): 105
Dilution (U/0.1 Cc): 1
Additional Area 3 Location: masseters
Additional Area 1 Location: Face
Consent: Written consent obtained. Risks include but not limited to lid/brow ptosis, bruising, swelling, diplopia, temporary effect, incomplete chemical denervation.
Additional Area 1 Units: 7
Post-Care Instructions: Patient instructed to not lie down for 4 hours and limit physical activity for 24 hours. Patient instructed not to travel by airplane for 48 hours.

## 2022-06-08 NOTE — PROCEDURE: OTHER (COSMETIC)
Detail Level: Zone
Other (Free Text): Patient states  had PDL on face. Reports significant swelling post tx. Treating MD was notified and patient was given prednisone. Patient also report crusting/scab at right lip corner post tx. Crusting  has resolved with slight redness still present. No evidence of scar, reassured patient. Sun avoidance/protection was stressed.

## 2022-06-08 NOTE — PROCEDURE: TREATMENT REGIMEN
Samples Given: MD Skin Essentials JOSE ALBERTO Lotion,Silk Shield spf 40, Replenish Neck and décolleté
Plan: AM: \\n-continue:\\n Cetaphil wash\\n-TNS Essential or Advanced.\\n-add MD Skin Essentials Haily Lotion (sample)\\n-SunBetter Tone Smart 70 spf tinted, or (sample given )of Silk Shield spf 40\\nPM:\\n-continue:\\n-Cetaphil wash\\n-continue Alpha Ret Overnight alternating with Tretinoin 0.025% compounded(new)\\n-continue Cerave PM\\n- MD Skin Essentials Replenish neck and décolleté
Detail Level: Simple

## 2022-07-20 ENCOUNTER — APPOINTMENT (RX ONLY)
Dept: URBAN - METROPOLITAN AREA CLINIC 173 | Facility: CLINIC | Age: 57
Setting detail: DERMATOLOGY
End: 2022-07-20

## 2022-07-20 DIAGNOSIS — Z41.9 ENCOUNTER FOR PROCEDURE FOR PURPOSES OTHER THAN REMEDYING HEALTH STATE, UNSPECIFIED: ICD-10-CM

## 2022-07-20 PROCEDURE — ? LASER COSMETIC

## 2022-07-20 PROCEDURE — ? FILLERS

## 2022-07-20 PROCEDURE — ? DYSPORT

## 2022-07-20 PROCEDURE — ? BOTOX

## 2022-07-20 ASSESSMENT — LOCATION SIMPLE DESCRIPTION DERM: LOCATION SIMPLE: RIGHT CHEEK

## 2022-07-20 ASSESSMENT — LOCATION DETAILED DESCRIPTION DERM: LOCATION DETAILED: RIGHT INFERIOR CENTRAL MALAR CHEEK

## 2022-07-20 ASSESSMENT — LOCATION ZONE DERM: LOCATION ZONE: FACE

## 2022-07-20 NOTE — PROCEDURE: LASER COSMETIC
Eye Protection: opaque goggles
Render Post-Care In The Note: No
Post-Care Instructions: I reviewed with the patient in detail post-care instructions. Patient should stay away from the sun and wear sun protection until treated areas are fully healed.steroid cream and ice applied post treatment
Anesthesia Volume In Cc: 0
Price (Use Numbers Only, No Special Characters Or $): 150
Treatment Number: 1
Spotsize (Include Units): 3mm
Detail Level: Zone
Pre-Procedure Care: Prior to the procedure the patient and all present had protective eyewear in place and a warning sign was placed on the door.
Total Pulses: 17
Laser Type: coolglide
Fluence (Include Units): 165-170J/cm2
Consent: Prior to the procedure consent obtained, risks reviewed including but not limited to crusting, scabbing, blistering, scarring, darker or lighter pigmentary change, incidental hair removal, bruising, and/or incomplete removal.
Beam Overlap/Density (Include Units): none
Pulse Duration (Include Units): 10ms
Indication: telangiectasia
Wavelength (Include Units): 1061
End-Point And Post-Procedure Care: The procedure continued until mild purpura was noted.  Immediately following the procedure, Vaseline and ice applied. Post care reviewed with patient.
Cooling: Contact

## 2022-07-20 NOTE — PROCEDURE: BOTOX
Additional Area 6 Units: 0
Show Additional Area 2: Yes
Additional Area 2 Location: Upper cutaneous lip
Show Lcl Units: No
Lot #: [id number]
Additional Area 3 Location: masseters
Dilution (U/0.1 Cc): 1
Detail Level: Zone
Expiration Date (Month Year): 07/24
Price (Use Numbers Only, No Special Characters Or $): 011
Additional Area 1 Location: Face
Consent: Written consent obtained. Risks include but not limited to lid/brow ptosis, bruising, swelling, diplopia, temporary effect, incomplete chemical denervation.
Additional Area 1 Units: 60
Post-Care Instructions: Patient instructed to not lie down for 4 hours and limit physical activity for 24 hours. Patient instructed not to travel by airplane for 48 hours.

## 2022-07-20 NOTE — PROCEDURE: DYSPORT
Show Depressor Anguli Units: Yes
Nasal Root Units: 0
Show Right And Left Pupillary Line Units: No
Expiration Date (Month Year): 1/31/2023
Price (Use Numbers Only, No Special Characters Or $): 247
Post-Care Instructions: Patient instructed to not lie down for 4 hours and limit physical activity for 24 hours. Patient instructed not to travel by airplane for 48 hours.
Additional Area 1 Location: face
Consent: Written consent obtained. Risks include but not limited to lid/brow ptosis, bruising, swelling, diplopia, temporary effect, incomplete chemical denervation.
Additional Area 1 Units: 53
Lot #: [id number]
Detail Level: Zone
Additional Area 2 Location: neck
Additional Area 3 Location: Perioral
Dilution (U/ 0.1cc): 10

## 2022-07-20 NOTE — PROCEDURE: FILLERS
Lot #: [id number]
Additional Area 1 Volume In Cc: 0
Include Cannula Information In Note?: No
Anesthesia Volume In Cc: 0.2
Expiration Date (Month Year): 08/04/2021
Include Cannula Information In Note?: Yes
Expiration Date (Month Year): 08/31/2020
Include Cannula Size?: 25G
Detail Level: Zone
Additional Area 1 Location: Face
Topical Anesthesia?: 2.5% lidocaine, 2.5% prilocaine
Additional Area 1 Volume In Cc: 0.3
Include Cannula Length?: 1.5 inch
Additional Area 2 Location: Hands
Price (Use Numbers Only, No Special Characters Or $): 400
Additional Area 3 Location: chin
Filler: RHA 2
Additional Area 2 Location: ear lobes
Map Statment: See Attach Map for Complete Details
Filler Comments: *[facility] Special*
Aspiration Statement: Aspiration was performed prior to injecting site with filler.
Vermilion Lips Filler Volume In Cc: 0.1
Consent: Written consent obtained. Risks include but not limited to bruising, beading, irregular texture, ulceration, infection, allergic reaction, scar formation, incomplete augmentation, temporary nature, procedural pain.
Lot #: [id number]
Post-Care Instructions: Patient instructed to apply ice to reduce swelling.
Expiration Date (Month Year): 6/11/24
Lot #: [id number]
Anesthesia Type: 1% lidocaine with epinephrine and a 1:10 solution of 8.4% sodium bicarbonate

## 2022-09-29 ENCOUNTER — APPOINTMENT (RX ONLY)
Dept: URBAN - METROPOLITAN AREA CLINIC 173 | Facility: CLINIC | Age: 57
Setting detail: DERMATOLOGY
End: 2022-09-29

## 2022-09-29 DIAGNOSIS — Z41.9 ENCOUNTER FOR PROCEDURE FOR PURPOSES OTHER THAN REMEDYING HEALTH STATE, UNSPECIFIED: ICD-10-CM

## 2022-09-29 PROCEDURE — ? DYSPORT

## 2022-09-29 PROCEDURE — ? BOTOX

## 2022-09-29 NOTE — PROCEDURE: DYSPORT
Periorbital Skin Units: 0
Show Right And Left Pupillary Line Units: No
Show Depressor Anguli Units: Yes
Expiration Date (Month Year): 03/23
Price (Use Numbers Only, No Special Characters Or $): 236
Detail Level: Zone
Consent: Written consent obtained. Risks include but not limited to lid/brow ptosis, bruising, swelling, diplopia, temporary effect, incomplete chemical denervation.
Additional Area 1 Location: face
Post-Care Instructions: Patient instructed to not lie down for 4 hours and limit physical activity for 24 hours. Patient instructed not to travel by airplane for 48 hours.
Additional Area 1 Units: 59
Additional Area 2 Location: neck
Lot #: [id number]
Dilution (U/ 0.1cc): 10
Additional Area 3 Location: Perioral

## 2022-09-29 NOTE — PROCEDURE: BOTOX
[facility] Units: 0
Show Depressor Anguli Units: Yes
Show Right And Left Brow Units: No
Expiration Date (Month Year): 04/24
Price (Use Numbers Only, No Special Characters Or $): 780
Additional Area 1 Location: Face
Additional Area 1 Units: 62
Consent: Written consent obtained. Risks include but not limited to lid/brow ptosis, bruising, swelling, diplopia, temporary effect, incomplete chemical denervation.
Post-Care Instructions: Patient instructed to not lie down for 4 hours and limit physical activity for 24 hours. Patient instructed not to travel by airplane for 48 hours.
Additional Area 2 Location: Upper cutaneous lip
Detail Level: Zone
Lot #: [id number]
Dilution (U/0.1 Cc): 1
Additional Area 3 Location: masseters

## 2022-10-05 ENCOUNTER — APPOINTMENT (RX ONLY)
Dept: URBAN - METROPOLITAN AREA CLINIC 166 | Facility: CLINIC | Age: 57
Setting detail: DERMATOLOGY
End: 2022-10-05

## 2022-10-05 DIAGNOSIS — Z71.89 OTHER SPECIFIED COUNSELING: ICD-10-CM

## 2022-10-05 DIAGNOSIS — D22 MELANOCYTIC NEVI: ICD-10-CM

## 2022-10-05 DIAGNOSIS — L82.1 OTHER SEBORRHEIC KERATOSIS: ICD-10-CM

## 2022-10-05 DIAGNOSIS — D18.0 HEMANGIOMA: ICD-10-CM

## 2022-10-05 DIAGNOSIS — L81.4 OTHER MELANIN HYPERPIGMENTATION: ICD-10-CM

## 2022-10-05 PROBLEM — D22.5 MELANOCYTIC NEVI OF TRUNK: Status: ACTIVE | Noted: 2022-10-05

## 2022-10-05 PROBLEM — D18.01 HEMANGIOMA OF SKIN AND SUBCUTANEOUS TISSUE: Status: ACTIVE | Noted: 2022-10-05

## 2022-10-05 PROCEDURE — ? COUNSELING

## 2022-10-05 PROCEDURE — ? LIQUID NITROGEN (COSMETIC)

## 2022-10-05 PROCEDURE — 99213 OFFICE O/P EST LOW 20 MIN: CPT

## 2022-10-05 ASSESSMENT — LOCATION SIMPLE DESCRIPTION DERM
LOCATION SIMPLE: RIGHT SHOULDER
LOCATION SIMPLE: LEFT LOWER BACK
LOCATION SIMPLE: CHEST
LOCATION SIMPLE: RIGHT UPPER BACK

## 2022-10-05 ASSESSMENT — LOCATION ZONE DERM
LOCATION ZONE: ARM
LOCATION ZONE: TRUNK

## 2022-10-05 ASSESSMENT — LOCATION DETAILED DESCRIPTION DERM
LOCATION DETAILED: RIGHT SUPERIOR UPPER BACK
LOCATION DETAILED: LEFT SUPERIOR LATERAL LOWER BACK
LOCATION DETAILED: RIGHT ANTERIOR SHOULDER
LOCATION DETAILED: RIGHT LATERAL SUPERIOR CHEST

## 2022-10-05 NOTE — PROCEDURE: LIQUID NITROGEN (COSMETIC)
Detail Level: Detailed
Render Post-Care Instructions In Note?: no
Price (Use Numbers Only, No Special Characters Or $): 0
Consent: The patient's consent was obtained including but not limited to risks of crusting, scabbing, blistering, scarring, darker or lighter pigmentary change, recurrence, incomplete removal and infection. The patient understands that the procedure is cosmetic in nature and is not covered by insurance.
Billing Information: Bill by Static Price
Show Spray Paint Technique Variable?: Yes
Spray Paint Text: The liquid nitrogen was applied to the skin utilizing a spray paint frosting technique.
Post-Care Instructions: I reviewed with the patient in detail post-care instructions. Patient is to wear sunprotection, and avoid picking at any of the treated lesions. Pt may apply Vaseline to crusted or scabbing areas.

## 2022-11-03 ENCOUNTER — APPOINTMENT (RX ONLY)
Dept: URBAN - METROPOLITAN AREA CLINIC 173 | Facility: CLINIC | Age: 57
Setting detail: DERMATOLOGY
End: 2022-11-03

## 2022-11-03 DIAGNOSIS — Z41.9 ENCOUNTER FOR PROCEDURE FOR PURPOSES OTHER THAN REMEDYING HEALTH STATE, UNSPECIFIED: ICD-10-CM

## 2022-11-03 PROCEDURE — ? OTHER (COSMETIC)

## 2022-11-03 PROCEDURE — ? BOTOX

## 2022-11-03 NOTE — PROCEDURE: BOTOX
L Brow Units: 0
Show Topical Anesthesia: Yes
Additional Area 2 Location: Upper cutaneous lip
Post-Care Instructions: Patient instructed to not lie down for 4 hours and limit physical activity for 24 hours. Patient instructed not to travel by airplane for 48 hours.
Dilution (U/0.1 Cc): 1
Detail Level: Zone
Lot #: [id number] * [facility] special *
Show Ucl Units: No
Additional Area 3 Location: masseters
Expiration Date (Month Year): 11/24
Additional Area 1 Location: neck
Additional Area 1 Units: 24
Consent: Written consent obtained. Risks include but not limited to lid/brow ptosis, bruising, swelling, diplopia, temporary effect, incomplete chemical denervation.

## 2022-12-07 ENCOUNTER — APPOINTMENT (RX ONLY)
Dept: URBAN - METROPOLITAN AREA CLINIC 173 | Facility: CLINIC | Age: 57
Setting detail: DERMATOLOGY
End: 2022-12-07

## 2022-12-07 DIAGNOSIS — Z41.9 ENCOUNTER FOR PROCEDURE FOR PURPOSES OTHER THAN REMEDYING HEALTH STATE, UNSPECIFIED: ICD-10-CM

## 2022-12-07 PROCEDURE — ? LASER COSMETIC

## 2022-12-07 ASSESSMENT — LOCATION DETAILED DESCRIPTION DERM: LOCATION DETAILED: LEFT CENTRAL MALAR CHEEK

## 2022-12-07 ASSESSMENT — LOCATION ZONE DERM: LOCATION ZONE: FACE

## 2022-12-07 ASSESSMENT — LOCATION SIMPLE DESCRIPTION DERM: LOCATION SIMPLE: LEFT CHEEK

## 2022-12-07 NOTE — PROCEDURE: LASER COSMETIC
Number Of Passes: 1
Wavelength (Include Units): 106
Render Post-Care In The Note: No
Laser Type: YAG Cool Glide 1069
Detail Level: Zone
Pulse Duration (Include Units): 8-10 ms
Beam Overlap/Density (Include Units): none
Fluence (Include Units): 170 J/cm2
Pre-Procedure Care: Prior to the procedure the patient and all present had protective eyewear in place and a warning sign was placed on the door.
Total Pulses: 17
Anesthesia Volume In Cc: 0
Consent: Prior to the procedure consent obtained, risks reviewed including but not limited to crusting, scabbing, blistering, scarring, darker or lighter pigmentary change, incidental hair removal, bruising, and/or incomplete removal.
Spotsize (Include Units): 3mm
Indication: telangiectasia
Price (Use Numbers Only, No Special Characters Or $): 200
Cooling: Contact
Eye Protection: opaque goggles
End-Point And Post-Procedure Care: The procedure continued until mild purpura was noted.  Immediately following the procedure, Vaseline and ice applied. Post care reviewed with patient.
Post-Care Instructions: I reviewed with the patient in detail post-care instructions. Patient should stay away from the sun and wear sun protection until treated areas are fully healed.

## 2022-12-28 ENCOUNTER — APPOINTMENT (RX ONLY)
Dept: URBAN - METROPOLITAN AREA CLINIC 173 | Facility: CLINIC | Age: 57
Setting detail: DERMATOLOGY
End: 2022-12-28

## 2022-12-28 DIAGNOSIS — Z41.9 ENCOUNTER FOR PROCEDURE FOR PURPOSES OTHER THAN REMEDYING HEALTH STATE, UNSPECIFIED: ICD-10-CM

## 2022-12-28 PROCEDURE — ? BOTOX

## 2022-12-28 PROCEDURE — ? DYSPORT

## 2022-12-28 NOTE — PROCEDURE: DYSPORT
Additional Area 1 Units: 0
Show Periorbital Units: Yes
Show Right And Left Pupillary Line Units: No
Additional Area 1 Location: face
Consent: Written consent obtained. Risks include but not limited to lid/brow ptosis, bruising, swelling, diplopia, temporary effect, incomplete chemical denervation.
Detail Level: Zone
Post-Care Instructions: Patient instructed to not lie down for 4 hours and limit physical activity for 24 hours. Patient instructed not to travel by airplane for 48 hours.
Additional Area 2 Location: neck
Additional Area 3 Location: Perioral
Lot #: [id number]
Dilution (U/ 0.1cc): 10
Expiration Date (Month Year): 5/31/23
Additional Area 4 Units: 65
Price (Use Numbers Only, No Special Characters Or $): 233
Additional Area 4 Location: neck bands

## 2022-12-28 NOTE — HPI: COSMETIC (BOTOX)
Have You Had Botox Before?: has had botox
Additional History: Patient currently being treated for adverse reaction to Fraxel procedure from another facility. Todays filler appt will be postponed until healed.  Botox/Dysport only.
When Was Your Last Botox Treatment?: 09/29/22

## 2022-12-28 NOTE — PROCEDURE: BOTOX
Show Topical Anesthesia: Yes
Periorbital Skin Units: 0
Lot #: [id number]
Show Lcl Units: No
Dilution (U/0.1 Cc): 1
Additional Area 1 Location: Face
Expiration Date (Month Year): 11/24
Detail Level: Zone
Additional Area 1 Units: 67
Price (Use Numbers Only, No Special Characters Or $): 639
Additional Area 2 Location: Upper cutaneous lip
Consent: Written consent obtained. Risks include but not limited to lid/brow ptosis, bruising, swelling, diplopia, temporary effect, incomplete chemical denervation.
Additional Area 3 Location: masseters
Post-Care Instructions: Patient instructed to not lie down for 4 hours and limit physical activity for 24 hours. Patient instructed not to travel by airplane for 48 hours.

## 2023-01-08 NOTE — PROCEDURE: DYSPORT
Additional Area 2 Units: 0
Consent: Written consent obtained. Risks include but not limited to lid/brow ptosis, bruising, swelling, diplopia, temporary effect, incomplete chemical denervation.
Expiration Date (Month Year): 09/17
Detail Level: Zone
Dilution (U/ 0.1cc): 10
Lot #: [id number]
Additional Area 1 Location: Neck bands
Additional Area 1 Units: 40
none

## 2023-03-09 ENCOUNTER — APPOINTMENT (RX ONLY)
Dept: URBAN - METROPOLITAN AREA CLINIC 173 | Facility: CLINIC | Age: 58
Setting detail: DERMATOLOGY
End: 2023-03-09

## 2023-03-09 DIAGNOSIS — Z41.9 ENCOUNTER FOR PROCEDURE FOR PURPOSES OTHER THAN REMEDYING HEALTH STATE, UNSPECIFIED: ICD-10-CM

## 2023-03-09 PROCEDURE — ? BOTOX

## 2023-03-09 PROCEDURE — ? FILLERS

## 2023-03-09 NOTE — PROCEDURE: BOTOX
Show Additional Area 3: Yes
Left Periorbital Units: 0
Additional Area 1 Location: Face
Dilution (U/0.1 Cc): 1
Additional Area 1 Units: 100
Show Right And Left Pupillary Line Units: No
Additional Area 3 Location: masseters
Expiration Date (Month Year): 05/25
Price (Use Numbers Only, No Special Characters Or $): 974
Incrementing Botox Units: By 0.5 Units
Consent: Written consent obtained. Risks include but not limited to lid/brow ptosis, bruising, swelling, diplopia, temporary effect, incomplete chemical denervation.
Additional Area 2 Location: Upper cutaneous lip
Post-Care Instructions: Patient instructed to not lie down for 4 hours and limit physical activity for 24 hours. Patient instructed not to travel by airplane for 48 hours.
Lot #: [id number]
Detail Level: Zone

## 2023-03-09 NOTE — PROCEDURE: FILLERS
Additional Area 5 Volume In Cc: 0
Include Cannula Information In Note?: No
Include Cannula Information In Note?: Yes
Anesthesia Volume In Cc: 0.2
Consent: Written consent obtained. Risks include but not limited to bruising, beading, irregular texture, ulceration, infection, allergic reaction, scar formation, incomplete augmentation, temporary nature, procedural pain.
Post-Care Instructions: Patient instructed to apply ice to reduce swelling.
Include Cannula Size?: 25G
Detail Level: Zone
Additional Area 1 Location: face
Topical Anesthesia?: 2.5% lidocaine, 2.5% prilocaine
Include Cannula Length?: 1.5 inch
Lot #: [id number]
Additional Area 2 Location: Hands
Price (Use Numbers Only, No Special Characters Or $): 911
Additional Area 3 Location: chin
Filler: RHA Redensity
Expiration Date (Month Year): 9/30/25
Map Statment: See Attach Map for Complete Details
Additional Area 1 Volume In Cc: 0.3
Additional Area 2 Location: ear lobes
Aspiration Statement: Aspiration was performed prior to injecting site with filler.
Filler: RHA 2
Lot #: [id number]
Lot #: [id number]
Expiration Date (Month Year): 9/23/25
Anesthesia Type: 1% lidocaine with epinephrine and a 1:10 solution of 8.4% sodium bicarbonate
Expiration Date (Month Year): 08/31/2020

## 2023-06-26 ENCOUNTER — APPOINTMENT (RX ONLY)
Dept: URBAN - METROPOLITAN AREA CLINIC 173 | Facility: CLINIC | Age: 58
Setting detail: DERMATOLOGY
End: 2023-06-26

## 2023-06-26 DIAGNOSIS — L82.1 OTHER SEBORRHEIC KERATOSIS: ICD-10-CM

## 2023-06-26 DIAGNOSIS — Z41.9 ENCOUNTER FOR PROCEDURE FOR PURPOSES OTHER THAN REMEDYING HEALTH STATE, UNSPECIFIED: ICD-10-CM

## 2023-06-26 PROCEDURE — ? COSMETIC CONSULTATION: LASER RESURFACING

## 2023-06-26 PROCEDURE — ? BOTOX

## 2023-06-26 PROCEDURE — ? MEDICAL CONSULTATION: SKIN TIGHTENING

## 2023-06-26 PROCEDURE — ? PRESCRIPTION

## 2023-06-26 PROCEDURE — ? LIQUID NITROGEN (COSMETIC)

## 2023-06-26 RX ORDER — PHARMACY COMPOUNDING ACCESSORY
EACH MISCELLANEOUS
Qty: 30 | Refills: 4 | Status: ERX | COMMUNITY
Start: 2023-06-26

## 2023-06-26 RX ADMIN — Medication: at 00:00

## 2023-06-26 ASSESSMENT — LOCATION SIMPLE DESCRIPTION DERM: LOCATION SIMPLE: RIGHT FOREHEAD

## 2023-06-26 ASSESSMENT — LOCATION DETAILED DESCRIPTION DERM: LOCATION DETAILED: RIGHT LATERAL FOREHEAD

## 2023-06-26 ASSESSMENT — LOCATION ZONE DERM: LOCATION ZONE: FACE

## 2023-06-26 NOTE — PROCEDURE: LIQUID NITROGEN (COSMETIC)
Render Post-Care Instructions In Note?: no
Consent: The patient's consent was obtained including but not limited to risks of crusting, scabbing, blistering, scarring, darker or lighter pigmentary change, recurrence, incomplete removal and infection. The patient understands that the procedure is cosmetic in nature and is not covered by insurance.
Billing Information: Bill by Static Price
Show Spray Paint Technique Variable?: Yes
Spray Paint Text: The liquid nitrogen was applied to the skin utilizing a spray paint frosting technique.
Post-Care Instructions: I reviewed with the patient in detail post-care instructions. Patient is to wear sunprotection, and avoid picking at any of the treated lesions. Pt may apply Vaseline to crusted or scabbing areas.
Detail Level: Detailed

## 2023-06-26 NOTE — PROCEDURE: BOTOX
Left Pupillary Line Units: 0
Dilution (U/0.1 Cc): 5
Incrementing Botox Units: By 0.5 Units
Additional Area 4 Units: 10
Show Orbicularis Oculi Units: Yes
Additional Area 1 Units: 3
Show Right And Left Pupillary Line Units: No
Additional Area 3 Location: neck
Glabellar Complex Units: 14
Detail Level: Detailed
Additional Area 3 Units: 42
Expiration Date (Month Year): 11/2025
Price (Use Numbers Only, No Special Characters Or $): 5174
Additional Area 2 Location: [facility]
Consent: Written consent obtained. Risks include but not limited to lid/brow ptosis, bruising, swelling, diplopia, temporary effect, incomplete chemical denervation.
Post-Care Instructions: Patient instructed to not lie down for 4 hours and limit physical activity for 24 hours. Patient instructed not to travel by airplane for 48 hours.
Additional Area 2 Units: 6
Periorbital Skin Units: 24
Lot #: [id number]
Additional Area 4 Location: [facility]
Additional Area 1 Location: lips

## 2023-09-12 ENCOUNTER — APPOINTMENT (RX ONLY)
Dept: URBAN - METROPOLITAN AREA CLINIC 173 | Facility: CLINIC | Age: 58
Setting detail: DERMATOLOGY
End: 2023-09-12

## 2023-09-12 ENCOUNTER — RX ONLY (OUTPATIENT)
Age: 58
Setting detail: RX ONLY
End: 2023-09-12

## 2023-09-12 DIAGNOSIS — Z41.9 ENCOUNTER FOR PROCEDURE FOR PURPOSES OTHER THAN REMEDYING HEALTH STATE, UNSPECIFIED: ICD-10-CM

## 2023-09-12 PROCEDURE — ? BOTOX

## 2023-09-12 PROCEDURE — ? FILLERS

## 2023-09-12 PROCEDURE — ? OTHER (COSMETIC)

## 2023-09-12 PROCEDURE — ? COSMETIC CONSULTATION: FRACTIONAL RESURFACING

## 2023-09-12 PROCEDURE — ? DAXXIFY

## 2023-09-12 RX ORDER — PHARMACY COMPOUNDING ACCESSORY
G EACH MISCELLANEOUS NIGHTLY
Qty: 30 | Refills: 1 | Status: ERX

## 2023-09-12 NOTE — PROCEDURE: FILLERS
Lateral Face Filler  Volume In Cc: 0
Additional Area 1 Location: Face
Topical Anesthesia?: 2.5% lidocaine, 2.5% prilocaine
Include Cannula Information In Note?: No
Include Cannula Information In Note?: Yes
Detail Level: Zone
Price (Use Numbers Only, No Special Characters Or $): 1200
Include Cannula Length?: 1.5 inch
Additional Area 2 Location: Hands
Include Cannula Size?: 25G
Additional Area 3 Location: chin
Filler: Restylane Contour
Cheeks Filler Volume In Cc: 0.5
Map Statment: See Attach Map for Complete Details
Additional Area 2 Location: ear lobes
Lot #: [id number]
Filler Comments: **[facility] Special**
Filler: RHA Redensity
Aspiration Statement: Aspiration was performed prior to injecting site with filler.
Expiration Date (Month Year): 08/31/2020
Lot #: [id number]
Expiration Date (Month Year): 2/28/24
Consent: Written consent obtained. Risks include but not limited to bruising, beading, irregular texture, ulceration, infection, allergic reaction, scar formation, incomplete augmentation, temporary nature, procedural pain.
Anesthesia Type: 1% lidocaine with epinephrine and a 1:10 solution of 8.4% sodium bicarbonate
Post-Care Instructions: Patient instructed to apply ice to reduce swelling.
Lot #: [id number] *[facility] SPECIAL *
Anesthesia Volume In Cc: 0.3
Expiration Date (Month Year): 6/27/25

## 2023-09-12 NOTE — PROCEDURE: OTHER (COSMETIC)
Detail Level: Zone
Other (Free Text): Discussed upper blepharoplasty \\nRecommended Dr KAYLA Alvarado and Dr ROOSEVELT Galaviz\\nContact info provided

## 2023-09-12 NOTE — PROCEDURE: BOTOX
R Brow Units: 0
Show Right And Left Brow Units: No
Detail Level: Zone
Show Lateral Platysmal Band Units: Yes
Additional Area 2 Location: Upper cutaneous lip
Expiration Date (Month Year): 4/26
Additional Area 3 Location: masseters
Price (Use Numbers Only, No Special Characters Or $): 130
Consent: Written consent obtained. Risks include but not limited to lid/brow ptosis, bruising, swelling, diplopia, temporary effect, incomplete chemical denervation.
Additional Area 1 Location: Face
Post-Care Instructions: Patient instructed to not lie down for 4 hours and limit physical activity for 24 hours. Patient instructed not to travel by airplane for 48 hours.
Additional Area 1 Units: 43
Lot #: [id number]
Incrementing Botox Units: By 0.5 Units
Dilution (U/0.1 Cc): 1

## 2023-09-12 NOTE — PROCEDURE: DAXXIFY
Depressor Anguli Oris Units: 0
Show Topical Anesthesia: Yes
Lot #: [id number]
Show Lcl Units: No
Dilution (U/0.1 Cc): 4
Detail Level: Detailed
Expiration Date (Month Year): 2/24
Additional Area 1 Location: face
Price (Use Numbers Only, No Special Characters Or $): 192
Post-Care Instructions: Patient instructed to not lie down for 4 hours and limit physical activity for 24 hours.
Consent: Written consent obtained. Risks include but not limited to lid/brow ptosis, bruising, swelling, diplopia, temporary effect, incomplete chemical denervation.
Additional Area 1 Units: 16

## 2023-10-02 ENCOUNTER — APPOINTMENT (RX ONLY)
Dept: URBAN - METROPOLITAN AREA CLINIC 173 | Facility: CLINIC | Age: 58
Setting detail: DERMATOLOGY
End: 2023-10-02

## 2023-10-02 DIAGNOSIS — Z41.9 ENCOUNTER FOR PROCEDURE FOR PURPOSES OTHER THAN REMEDYING HEALTH STATE, UNSPECIFIED: ICD-10-CM

## 2023-10-02 DIAGNOSIS — L82.1 OTHER SEBORRHEIC KERATOSIS: ICD-10-CM

## 2023-10-02 PROCEDURE — ? COSMETIC CONSULTATION: SCLEROTHERAPY

## 2023-10-02 PROCEDURE — ? EXCEL HR

## 2023-10-02 PROCEDURE — ? FRAXEL

## 2023-10-02 PROCEDURE — ? PRESCRIPTION

## 2023-10-02 PROCEDURE — ? LIQUID NITROGEN (COSMETIC)

## 2023-10-02 RX ORDER — BIMATOPROST 0.3 MG/ML
SOLUTION/ DROPS OPHTHALMIC
Qty: 5 | Refills: 3 | Status: ERX | COMMUNITY
Start: 2023-10-02

## 2023-10-02 RX ADMIN — BIMATOPROST: 0.3 SOLUTION/ DROPS OPHTHALMIC at 00:00

## 2023-10-02 ASSESSMENT — LOCATION SIMPLE DESCRIPTION DERM
LOCATION SIMPLE: RIGHT SHOULDER
LOCATION SIMPLE: LEFT CHEEK

## 2023-10-02 ASSESSMENT — LOCATION DETAILED DESCRIPTION DERM
LOCATION DETAILED: LEFT CENTRAL MALAR CHEEK
LOCATION DETAILED: RIGHT ANTERIOR SHOULDER

## 2023-10-02 ASSESSMENT — LOCATION ZONE DERM
LOCATION ZONE: FACE
LOCATION ZONE: ARM

## 2023-10-02 NOTE — PROCEDURE: LIQUID NITROGEN (COSMETIC)
Spray Paint Technique: No
Billing Information: Bill by Static Price
Show Spray Paint Technique Variable?: Yes
Spray Paint Text: The liquid nitrogen was applied to the skin utilizing a spray paint frosting technique.
Detail Level: Detailed
Consent: The patient's consent was obtained including but not limited to risks of crusting, scabbing, blistering, scarring, darker or lighter pigmentary change, recurrence, incomplete removal and infection. The patient understands that the procedure is cosmetic in nature and is not covered by insurance.
Price (Use Numbers Only, No Special Characters Or $): 0
Post-Care Instructions: I reviewed with the patient in detail post-care instructions. Patient is to wear sunprotection, and avoid picking at any of the treated lesions. Pt may apply Vaseline to crusted or scabbing areas.

## 2023-10-02 NOTE — PROCEDURE: EXCEL HR
Number Of Prepaid Treatments (Will Not Render If 0): 0
External Cooling Fan Speed: 5
Spot Size: 3 mm
Consent: Written consent obtained, risks reviewed including but not limited to crusting, scabbing, blistering, scarring, darker or lighter pigmentary change, and/or incomplete removal.
Repetition Rate: single shot
Fluence In J/Cm2: 190
Pulse Width In Msec: 10
Detail Level: Detailed
Total Pulses: 7
Laser Type: 1064 nm
Treatment Number: 1
Immediate Endpoint Findings: no edema or erythema
Post-Procedure Text: Vaseline and ice applied. Post care reviewed with patient.
Post-Care Instructions: I reviewed with the patient in detail post-care instructions. Patient is to apply vaseline with a q-tip to all crusted areas, and avoid picking at any scabs. Pt should stay away from the sun and wear sun protection until fully healed.

## 2023-10-02 NOTE — PROCEDURE: FRAXEL
Energy(Mj/Cm2): 1
Was An Eye Shield Used?: No
Price (Use Numbers Only, No Special Characters Or $): 130
Length Of Topical Anesthesia Application (Optional): 60 minutes
Post-Care Instructions: I reviewed with the patient in detail post-care instructions. Patient should avoid sun until area fully healed.
Detail Level: Zone
Medium Plastic Eye Shield Text: The ocular mucosa was anesthetized with tetracaine. Once adequate anesthesia was optained, medium plastic eye shields were inserted and remained in place until the procedure was completed.
Large Plastic Eye Shield Text: The ocular mucosa was anesthetized with tetracaine. Once adequate anesthesia was optained, large plastic eye shields were inserted and remained in place until the procedure was completed.
Small Metal Eye Shield Text: The ocular mucosa was anesthetized with tetracaine. Once adequate anesthesia was optained, small metal eye shields were inserted and remained in place until the procedure was completed.
Wavelength: 1550nm
Anesthesia Type: 1% lidocaine with epinephrine
Indication: surgical scars
Medium Metal Eye Shield Text: The ocular mucosa was anesthetized with tetracaine. Once adequate anesthesia was optained, medium metal eye shields were inserted and remained in place until the procedure was completed.
Energy(Mj/Cm2): 60
Tip: 15mm
Consent: Written consent obtained, risks reviewed including but not limited to pain and incomplete improvement.
Large Metal Eye Shield Text: The ocular mucosa was anesthetized with tetracaine. Once adequate anesthesia was optained, large metal eye shields were inserted and remained in place until the procedure was completed.
Treatment Level: 5
Location: Use Location Override
External Cooling: Mumtaz Cryo 5
Number Of Passes: 6
Topical Anesthesia Type: 30% lidocaine, plasticized base
Small Plastic Eye Shield Text: The ocular mucosa was anesthetized with tetracaine. Once adequate anesthesia was optained, small plastic eye shields were inserted and remained in place until the procedure was completed.
Total Coverage: 14%
Location Override: L knee
Depth In Microns (Use Numbers Only, No Special Characters Or $): 5225

## 2023-11-20 ENCOUNTER — APPOINTMENT (RX ONLY)
Dept: URBAN - METROPOLITAN AREA CLINIC 173 | Facility: CLINIC | Age: 58
Setting detail: DERMATOLOGY
End: 2023-11-20

## 2023-11-20 DIAGNOSIS — Z41.9 ENCOUNTER FOR PROCEDURE FOR PURPOSES OTHER THAN REMEDYING HEALTH STATE, UNSPECIFIED: ICD-10-CM

## 2023-11-20 PROCEDURE — ? PULSED-DYE LASER

## 2023-11-20 PROCEDURE — ? FRAXEL

## 2023-11-20 ASSESSMENT — LOCATION ZONE DERM: LOCATION ZONE: FACE

## 2023-11-20 ASSESSMENT — LOCATION SIMPLE DESCRIPTION DERM: LOCATION SIMPLE: LEFT CHEEK

## 2023-11-20 ASSESSMENT — LOCATION DETAILED DESCRIPTION DERM: LOCATION DETAILED: LEFT INFERIOR CENTRAL MALAR CHEEK

## 2023-11-20 NOTE — PROCEDURE: PULSED-DYE LASER
Pulse Count (Optional): 3
Cryogen Time (Ms): 30
Pulse Duration: 10 ms
Spot Size: 10 mm
Immediate Endpoint: erythema
Cryogen Time (Ms): 30
Laser Type: Vbeam 595nm
Spot Size: 7 mm
Delay Time (Ms): 20
Delay Time (Ms): 20
Location (Required For Details To Render In Note But Body Touch Will Also Count For First Location): left cheek
Post-Procedure Care: Vaseline and ice applied. Post care reviewed with patient.
Treated Area: small area
Consent: Written consent obtained, risks reviewed including but not limited to crusting, scabbing, blistering, scarring, darker or lighter pigmentary change, incidental hair removal, bruising, and/or incomplete removal.
Detail Level: Zone
Fluence In J/Cm2 (Optional): 8
Post-Care Instructions: I reviewed with the patient in detail post-care instructions. Patient should stay away from the sun and wear sun protection until treated areas are fully healed.

## 2023-11-20 NOTE — PROCEDURE: FRAXEL
Energy(Mj/Cm2): 1
Was An Eye Shield Used?: No
Price (Use Numbers Only, No Special Characters Or $): 745
Length Of Topical Anesthesia Application (Optional): 60 minutes
Post-Care Instructions: I reviewed with the patient in detail post-care instructions. Patient should avoid sun until area fully healed.
Total Energy In Kj (Optional- Don't Include Units): 0.24
Detail Level: Zone
Medium Plastic Eye Shield Text: The ocular mucosa was anesthetized with tetracaine. Once adequate anesthesia was optained, medium plastic eye shields were inserted and remained in place until the procedure was completed.
Large Plastic Eye Shield Text: The ocular mucosa was anesthetized with tetracaine. Once adequate anesthesia was optained, large plastic eye shields were inserted and remained in place until the procedure was completed.
Small Metal Eye Shield Text: The ocular mucosa was anesthetized with tetracaine. Once adequate anesthesia was optained, small metal eye shields were inserted and remained in place until the procedure was completed.
Wavelength: 1550nm
Anesthesia Type: 1% lidocaine with epinephrine
Indication: surgical scars
Medium Metal Eye Shield Text: The ocular mucosa was anesthetized with tetracaine. Once adequate anesthesia was optained, medium metal eye shields were inserted and remained in place until the procedure was completed.
Energy(Mj/Cm2): 60
Tip: 15mm
Consent: Written consent obtained, risks reviewed including but not limited to pain and incomplete improvement.
Treatment Number: 2
Large Metal Eye Shield Text: The ocular mucosa was anesthetized with tetracaine. Once adequate anesthesia was optained, large metal eye shields were inserted and remained in place until the procedure was completed.
Treatment Level: 5
Location: Use Location Override
External Cooling: Mumtaz Cryo 5
Number Of Passes: 6
Topical Anesthesia Type: 2.5% lidocaine, 2.5% prilocaine
Small Plastic Eye Shield Text: The ocular mucosa was anesthetized with tetracaine. Once adequate anesthesia was optained, small plastic eye shields were inserted and remained in place until the procedure was completed.
Total Coverage: 14%
Location Override: L knee
Depth In Microns (Use Numbers Only, No Special Characters Or $): 6502

## 2024-02-08 ENCOUNTER — APPOINTMENT (RX ONLY)
Dept: URBAN - METROPOLITAN AREA CLINIC 173 | Facility: CLINIC | Age: 59
Setting detail: DERMATOLOGY
End: 2024-02-08

## 2024-02-08 DIAGNOSIS — Z41.9 ENCOUNTER FOR PROCEDURE FOR PURPOSES OTHER THAN REMEDYING HEALTH STATE, UNSPECIFIED: ICD-10-CM

## 2024-02-08 PROCEDURE — ? FRAXEL

## 2024-02-08 PROCEDURE — ? COSMETIC CONSULTATION: LASER RESURFACING

## 2024-02-08 PROCEDURE — ? COSMETIC CONSULTATION: FILLERS

## 2024-02-08 PROCEDURE — ? TREATMENT REGIMEN

## 2024-02-08 ASSESSMENT — LOCATION ZONE DERM
LOCATION ZONE: FACE
LOCATION ZONE: NECK
LOCATION ZONE: TRUNK

## 2024-02-08 ASSESSMENT — LOCATION SIMPLE DESCRIPTION DERM
LOCATION SIMPLE: LEFT ANTERIOR NECK
LOCATION SIMPLE: LEFT CHEEK
LOCATION SIMPLE: GROIN

## 2024-02-08 ASSESSMENT — LOCATION DETAILED DESCRIPTION DERM
LOCATION DETAILED: LEFT SUPERIOR ANTERIOR NECK
LOCATION DETAILED: RIGHT SUPRAPUBIC SKIN
LOCATION DETAILED: LEFT INFERIOR CENTRAL MALAR CHEEK

## 2024-02-08 NOTE — PROCEDURE: FRAXEL
Total Energy In Kj (Optional- Don't Include Units): 1.96
Treatment Level: 1
Detail Level: Zone
Treatment Level: 5
Large Plastic Eye Shield Text: The ocular mucosa was anesthetized with tetracaine. Once adequate anesthesia was optained, large plastic eye shields were inserted and remained in place until the procedure was completed.
Number Of Passes: 6
Wavelength: 1550nm
Small Metal Eye Shield Text: The ocular mucosa was anesthetized with tetracaine. Once adequate anesthesia was optained, small metal eye shields were inserted and remained in place until the procedure was completed.
Wavelength: 1927nm
Indication: dyschromia
Medium Metal Eye Shield Text: The ocular mucosa was anesthetized with tetracaine. Once adequate anesthesia was optained, medium metal eye shields were inserted and remained in place until the procedure was completed.
Depth In Microns (Use Numbers Only, No Special Characters Or $): 9744
Anesthesia Type: 1% lidocaine with epinephrine
Energy(Mj/Cm2): 20
Tip: 15mm
Total Coverage: 14%
Consent: Written consent obtained, risks reviewed including but not limited to pain and incomplete improvement.
Location: Use Location Override
Large Metal Eye Shield Text: The ocular mucosa was anesthetized with tetracaine. Once adequate anesthesia was optained, large metal eye shields were inserted and remained in place until the procedure was completed.
Total Energy In Kj (Optional- Don't Include Units): 0.39
Treatment Level: 4
Topical Anesthesia Type: 30% lidocaine, plasticized base
Location: lower abdomen
Add Post-Care Below To The Note: No
Length Of Topical Anesthesia Application (Optional): 60 minutes
Total Coverage: 35%
Small Plastic Eye Shield Text: The ocular mucosa was anesthetized with tetracaine. Once adequate anesthesia was optained, small plastic eye shields were inserted and remained in place until the procedure was completed.
Depth In Microns (Use Numbers Only, No Special Characters Or $): 202
Location Override: face, neck
Energy(Mj/Cm2): 60
Post-Care Instructions: I reviewed with the patient in detail post-care instructions. Patient should avoid sun until area fully healed.
Medium Plastic Eye Shield Text: The ocular mucosa was anesthetized with tetracaine. Once adequate anesthesia was optained, medium plastic eye shields were inserted and remained in place until the procedure was completed.
Price (Use Numbers Only, No Special Characters Or $): 1500

## 2024-03-05 ENCOUNTER — RX ONLY (OUTPATIENT)
Age: 59
Setting detail: RX ONLY
End: 2024-03-05

## 2024-03-05 ENCOUNTER — APPOINTMENT (RX ONLY)
Dept: URBAN - METROPOLITAN AREA CLINIC 173 | Facility: CLINIC | Age: 59
Setting detail: DERMATOLOGY
End: 2024-03-05

## 2024-03-05 DIAGNOSIS — Z41.9 ENCOUNTER FOR PROCEDURE FOR PURPOSES OTHER THAN REMEDYING HEALTH STATE, UNSPECIFIED: ICD-10-CM

## 2024-03-05 PROCEDURE — ? PULSED-DYE LASER

## 2024-03-05 RX ORDER — LIDOCAINE AND PRILOCAINE 25; 25 MG/G; MG/G
CREAM TOPICAL
Qty: 30 | Refills: 3 | Status: ERX

## 2024-03-05 ASSESSMENT — LOCATION DETAILED DESCRIPTION DERM
LOCATION DETAILED: LEFT CENTRAL EYEBROW
LOCATION DETAILED: RIGHT CENTRAL EYEBROW
LOCATION DETAILED: RIGHT MEDIAL INFERIOR EYELID
LOCATION DETAILED: LEFT SUPERIOR CENTRAL MALAR CHEEK

## 2024-03-05 ASSESSMENT — LOCATION SIMPLE DESCRIPTION DERM
LOCATION SIMPLE: RIGHT INFERIOR EYELID
LOCATION SIMPLE: LEFT CHEEK
LOCATION SIMPLE: LEFT EYEBROW
LOCATION SIMPLE: RIGHT EYEBROW

## 2024-03-05 ASSESSMENT — LOCATION ZONE DERM
LOCATION ZONE: EYELID
LOCATION ZONE: FACE

## 2024-03-05 NOTE — PROCEDURE: PULSED-DYE LASER
Pulse Duration: 10 ms
Consent: Written consent obtained, risks reviewed including but not limited to crusting, scabbing, blistering, scarring, darker or lighter pigmentary change, incidental hair removal, bruising, and/or incomplete removal.
Pulse Duration: 6 ms
Cryogen Time (Ms): 30
Location (Required For Details To Render In Note But Body Touch Will Also Count For First Location): lower lids
Post-Procedure Care: Vaseline and ice applied. Post care reviewed with patient.
Spot Size: 7 mm
Delay Time (Ms): 20
Post-Care Instructions: I reviewed with the patient in detail post-care instructions. Patient should stay away from the sun and wear sun protection until treated areas are fully healed.
Fluence In J/Cm2 (Optional): 6.00
Detail Level: Zone
Price (Use Numbers Only, No Special Characters Or $): 591
Pulse Count: 15
Laser Type: Vbeam 595nm
Spot Size: 10 mm

## 2024-03-06 ENCOUNTER — APPOINTMENT (RX ONLY)
Dept: URBAN - METROPOLITAN AREA CLINIC 173 | Facility: CLINIC | Age: 59
Setting detail: DERMATOLOGY
End: 2024-03-06

## 2024-03-06 DIAGNOSIS — Z41.9 ENCOUNTER FOR PROCEDURE FOR PURPOSES OTHER THAN REMEDYING HEALTH STATE, UNSPECIFIED: ICD-10-CM

## 2024-03-06 PROCEDURE — ? PULSED-DYE LASER

## 2024-03-06 ASSESSMENT — LOCATION DETAILED DESCRIPTION DERM
LOCATION DETAILED: LEFT SUPERIOR CENTRAL MALAR CHEEK
LOCATION DETAILED: RIGHT MEDIAL INFERIOR EYELID
LOCATION DETAILED: RIGHT CENTRAL EYEBROW
LOCATION DETAILED: LEFT CENTRAL EYEBROW

## 2024-03-06 ASSESSMENT — LOCATION ZONE DERM
LOCATION ZONE: FACE
LOCATION ZONE: EYELID

## 2024-03-06 ASSESSMENT — LOCATION SIMPLE DESCRIPTION DERM
LOCATION SIMPLE: RIGHT INFERIOR EYELID
LOCATION SIMPLE: LEFT CHEEK
LOCATION SIMPLE: RIGHT EYEBROW
LOCATION SIMPLE: LEFT EYEBROW

## 2024-03-06 NOTE — PROCEDURE: PULSED-DYE LASER
Pulse Duration: 10 ms
Pulse Count (Location 2): 6
Consent: Written consent obtained, risks reviewed including but not limited to crusting, scabbing, blistering, scarring, darker or lighter pigmentary change, incidental hair removal, bruising, and/or incomplete removal.
Pulse Duration: 6 ms
Cryogen Time (Ms): 30
Location (Required For Details To Render In Note But Body Touch Will Also Count For First Location): lower lids
Post-Procedure Care: Vaseline and ice applied. Post care reviewed with patient.
Spot Size: 7 mm
Delay Time (Ms): 20
Spot Size: 10 mm
Post-Care Instructions: I reviewed with the patient in detail post-care instructions. Patient should stay away from the sun and wear sun protection until treated areas are fully healed.
Fluence In J/Cm2 (Optional): 7.50
Detail Level: Zone
Price (Use Numbers Only, No Special Characters Or $): 0
Laser Type: Vbeam 595nm
Fluence In J/Cm2 (Optional): 7.00

## 2024-03-12 ENCOUNTER — APPOINTMENT (RX ONLY)
Dept: URBAN - METROPOLITAN AREA CLINIC 173 | Facility: CLINIC | Age: 59
Setting detail: DERMATOLOGY
End: 2024-03-12

## 2024-03-12 DIAGNOSIS — Z41.9 ENCOUNTER FOR PROCEDURE FOR PURPOSES OTHER THAN REMEDYING HEALTH STATE, UNSPECIFIED: ICD-10-CM

## 2024-03-12 PROCEDURE — ? BOTOX

## 2024-03-12 PROCEDURE — ? FILLERS

## 2024-03-12 NOTE — PROCEDURE: FILLERS
Temple Hollows Filler Volume In Cc: 0
Price (Use Numbers Only, No Special Characters Or $): 9212
Include Cannula Length?: 1.5 inch
Lot #: [id number]
Expiration Date (Month Year): 03/11/2026
Use Map Statement For Sites (Optional): No
Additional Area 1 Location: Face
Include Cannula Size?: 25G
Include Cannula Information In Note?: Yes
Additional Area 1 Volume In Cc: 0.4
Topical Anesthesia?: 2.5% lidocaine, 2.5% prilocaine
Map Statment: See Attach Map for Complete Details
Additional Area 2 Location: Hands
Additional Area 3 Location: chin
Filler: Skinvive
Lot #: [id number]
Aspiration Statement: Aspiration was performed prior to injecting site with filler.
Expiration Date (Month Year): 08/31/2020
Additional Area 1 Volume In Cc: 1
Anesthesia Type: 1% lidocaine with epinephrine and a 1:10 solution of 8.4% sodium bicarbonate
Consent: Written consent obtained. Risks include but not limited to bruising, beading, irregular texture, ulceration, infection, allergic reaction, scar formation, incomplete augmentation, temporary nature, procedural pain.
Additional Area 2 Location: ear lobes
Post-Care Instructions: Patient instructed to apply ice to reduce swelling.
Lot #: [id number]
Anesthesia Volume In Cc: 0.3
Filler: RHA 3
Expiration Date (Month Year): 05/28/2024
Detail Level: Zone

## 2024-03-12 NOTE — PROCEDURE: BOTOX
Lcl Root Units: 0
Additional Area 2 Location: Upper cutaneous lip
Show Ucl Units: No
Price (Use Numbers Only, No Special Characters Or $): 271
Show Levator Superior Units: Yes
Consent: Written consent obtained. Risks include but not limited to lid/brow ptosis, bruising, swelling, diplopia, temporary effect, incomplete chemical denervation.
Post-Care Instructions: Patient instructed to not lie down for 4 hours and limit physical activity for 24 hours. Patient instructed not to travel by airplane for 48 hours.
Incrementing Botox Units: By 0.5 Units
Dilution (U/0.1 Cc): 1
Lot #: [id number]
Additional Area 1 Location: Face
Additional Area 1 Units: 62
Detail Level: Zone
Additional Area 3 Location: masseters
Expiration Date (Month Year): 05/26
Enbrel Counseling:  I discussed with the patient the risks of etanercept including but not limited to myelosuppression, immunosuppression, autoimmune hepatitis, demyelinating diseases, lymphoma, and infections.  The patient understands that monitoring is required including a PPD at baseline and must alert us or the primary physician if symptoms of infection or other concerning signs are noted.

## 2024-03-26 ENCOUNTER — APPOINTMENT (RX ONLY)
Dept: URBAN - METROPOLITAN AREA CLINIC 173 | Facility: CLINIC | Age: 59
Setting detail: DERMATOLOGY
End: 2024-03-26

## 2024-03-26 DIAGNOSIS — Z41.9 ENCOUNTER FOR PROCEDURE FOR PURPOSES OTHER THAN REMEDYING HEALTH STATE, UNSPECIFIED: ICD-10-CM

## 2024-03-26 PROCEDURE — ? FILLERS

## 2024-03-26 PROCEDURE — ? DAXXIFY

## 2024-03-26 PROCEDURE — ? OTHER (COSMETIC)

## 2024-03-26 NOTE — PROCEDURE: DAXXIFY
Anterior Platysmal Bands Units: 0
Show Glabellar Units: Yes
Detail Level: Detailed
Expiration Date (Month Year): 4/8/24
Bill Summary Price Listed Below, Or Bill Total Of Units X Price Per Unit?: Bill Summary Price Below
Additional Area 1 Location: face
Show Right And Left Pupillary Line Units: No
Additional Area 1 Units: 16
Lot #: [id number]
Price (Use Numbers Only, No Special Characters Or $): 160
Dilution (U/0.1 Cc): 4
Consent: Written consent obtained. Risks include but not limited to lid/brow ptosis, bruising, swelling, diplopia, temporary effect, incomplete chemical denervation.
Post-Care Instructions: Patient instructed to not lie down for 4 hours and limit physical activity for 24 hours.

## 2024-03-26 NOTE — PROCEDURE: OTHER (COSMETIC)
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
Detail Level: Simple

## 2024-03-26 NOTE — PROCEDURE: FILLERS
Vermilion Lips Filler Volume In Cc: 0
Lot #: [id number]
Include Cannula Information In Note?: No
Additional Area 3 Location: chin
Include Cannula Size?: 25G
Topical Anesthesia?: 2.5% lidocaine, 2.5% prilocaine
Detail Level: Zone
Additional Area 1 Location: Face
Expiration Date (Month Year): 08/31/2020
Include Cannula Length?: 1.5 inch
Include Cannula Information In Note?: Yes
Additional Area 2 Location: ear lobes
Price (Use Numbers Only, No Special Characters Or $): 200
Filler: RHA 2
Lot #: [id number] *[facility] SPECIAL *
Map Statment: See Attach Map for Complete Details
Expiration Date (Month Year): 6/23/26
Aspiration Statement: Aspiration was performed prior to injecting site with filler.
Vermilion Lips Filler Volume In Cc: 0.2
Lot #: [id number]
Consent: Written consent obtained. Risks include but not limited to bruising, beading, irregular texture, ulceration, infection, allergic reaction, scar formation, incomplete augmentation, temporary nature, procedural pain.
Expiration Date (Month Year): 08/04/2021
Post-Care Instructions: Patient instructed to apply ice to reduce swelling.
Anesthesia Type: 1% lidocaine with epinephrine and a 1:10 solution of 8.4% sodium bicarbonate
Anesthesia Volume In Cc: 0.3
Additional Area 2 Location: Hands

## 2024-04-15 ENCOUNTER — APPOINTMENT (RX ONLY)
Dept: URBAN - METROPOLITAN AREA CLINIC 173 | Facility: CLINIC | Age: 59
Setting detail: DERMATOLOGY
End: 2024-04-15

## 2024-04-15 DIAGNOSIS — Z41.9 ENCOUNTER FOR PROCEDURE FOR PURPOSES OTHER THAN REMEDYING HEALTH STATE, UNSPECIFIED: ICD-10-CM

## 2024-04-15 PROCEDURE — ? FILLERS

## 2024-04-15 PROCEDURE — ? DIAGNOSIS COMMENT

## 2024-04-15 PROCEDURE — ? PRESCRIPTION

## 2024-04-15 PROCEDURE — ? TREATMENT REGIMEN

## 2024-04-15 ASSESSMENT — LOCATION SIMPLE DESCRIPTION DERM: LOCATION SIMPLE: LEFT THIGH

## 2024-04-15 ASSESSMENT — LOCATION DETAILED DESCRIPTION DERM: LOCATION DETAILED: LEFT ANTERIOR DISTAL THIGH

## 2024-04-15 ASSESSMENT — LOCATION ZONE DERM: LOCATION ZONE: LEG

## 2024-04-15 NOTE — PROCEDURE: FILLERS
Brows Filler Volume In Cc: 0
Use Map Statement For Sites (Optional): No
Additional Area 1 Location: L anterior thigh
Consent: Risks reviewed including but not limited to bruising, irregular texture, incomplete augmentation and procedural pain. Written consent obtained.
Additional Area 1 Volume In Cc: 1
Map Statment: See Attach Map for Complete Details
Filler: Juvederm Voluma XC
Lot #: [id number] *[facility] special*
Anesthesia Volume In Cc: 0.5
Lot #: [id number] *[facility] special*
Additional Anesthesia Volume In Cc: 6
Topical Anesthesia?: 30% lidocaine
Include Cannula Length?: 1.5 inch
Detail Level: Simple
Price (Use Numbers Only, No Special Characters Or $): 173
Filler: Restylane Defyne

## 2024-04-15 NOTE — PROCEDURE: DIAGNOSIS COMMENT
Detail Level: Simple
Comment: Discussed options for indentation including HA filler vs sculptra vs CO2 with sculptra vs subcision vs combo. Will start with subcision with HA filler today and could consider CO2 with Sculptra in the future.
Render Risk Assessment In Note?: no

## 2024-04-16 RX ORDER — PHARMACY COMPOUNDING ACCESSORY
G EACH MISCELLANEOUS NIGHTLY
Qty: 30 | Refills: 1 | Status: ERX

## 2024-04-23 ENCOUNTER — APPOINTMENT (RX ONLY)
Dept: URBAN - METROPOLITAN AREA CLINIC 173 | Facility: CLINIC | Age: 59
Setting detail: DERMATOLOGY
End: 2024-04-23

## 2024-04-23 DIAGNOSIS — Z41.9 ENCOUNTER FOR PROCEDURE FOR PURPOSES OTHER THAN REMEDYING HEALTH STATE, UNSPECIFIED: ICD-10-CM

## 2024-04-23 PROCEDURE — ? Q-SWITCHED LASER

## 2024-04-23 ASSESSMENT — LOCATION ZONE DERM
LOCATION ZONE: FACE
LOCATION ZONE: ARM
LOCATION ZONE: TRUNK

## 2024-04-23 ASSESSMENT — LOCATION SIMPLE DESCRIPTION DERM
LOCATION SIMPLE: RIGHT CLAVICULAR SKIN
LOCATION SIMPLE: LEFT FOREHEAD
LOCATION SIMPLE: LEFT FOREARM
LOCATION SIMPLE: RIGHT FOREARM
LOCATION SIMPLE: CHEST

## 2024-04-23 ASSESSMENT — LOCATION DETAILED DESCRIPTION DERM
LOCATION DETAILED: RIGHT CLAVICULAR SKIN
LOCATION DETAILED: LEFT PROXIMAL DORSAL FOREARM
LOCATION DETAILED: RIGHT PROXIMAL DORSAL FOREARM
LOCATION DETAILED: LEFT LATERAL SUPERIOR CHEST
LOCATION DETAILED: LEFT SUPERIOR FOREHEAD

## 2024-04-23 NOTE — PROCEDURE: Q-SWITCHED LASER
Treatment Number: 1
Post-Care Instructions: I reviewed with the patient in detail post-care instructions. Patient should avoid sunlight and wear sun protection.
Fluence: 4.5
Anesthesia Type: 1% lidocaine with epinephrine
External Cooling Fan Speed: 0
Detail Level: Zone
Eye Protection: wavelength-specific goggles
Endpoint: Immediate endpoint whitening and pinpoint bleeding. Vaseline and ice applied. Post care reviewed with patient.
Spot Size In Mm: 4
Spot Size In Mm: 2
Consent: Written consent obtained, risks reviewed including but not limited to crusting, scabbing, blistering, scarring, darker or lighter pigmentary change, systemic reactions, ulceration, incidental hair removal, bruising, and/or incomplete removal.
Price (Use Numbers Only, No Special Characters Or $): 907

## 2024-05-13 ENCOUNTER — APPOINTMENT (RX ONLY)
Dept: URBAN - METROPOLITAN AREA CLINIC 173 | Facility: CLINIC | Age: 59
Setting detail: DERMATOLOGY
End: 2024-05-13

## 2024-05-13 DIAGNOSIS — Z41.9 ENCOUNTER FOR PROCEDURE FOR PURPOSES OTHER THAN REMEDYING HEALTH STATE, UNSPECIFIED: ICD-10-CM

## 2024-05-13 DIAGNOSIS — L90.5 SCAR CONDITIONS AND FIBROSIS OF SKIN: ICD-10-CM

## 2024-05-13 PROCEDURE — ? FILLERS

## 2024-05-13 PROCEDURE — ? FRAXEL

## 2024-05-13 PROCEDURE — ? SUBCISION

## 2024-05-13 PROCEDURE — ? TREATMENT REGIMEN

## 2024-05-13 PROCEDURE — ? DIAGNOSIS COMMENT

## 2024-05-13 ASSESSMENT — LOCATION DETAILED DESCRIPTION DERM: LOCATION DETAILED: LEFT KNEE

## 2024-05-13 ASSESSMENT — LOCATION SIMPLE DESCRIPTION DERM: LOCATION SIMPLE: LEFT KNEE

## 2024-05-13 ASSESSMENT — LOCATION ZONE DERM: LOCATION ZONE: LEG

## 2024-05-13 NOTE — HPI: SCAR (COMPLEX), EYELID
scar_concerns_texture
How Severe Is The Scar?: mild
Is This A New Presentation, Or A Follow-Up?: Eyelid Scar
Additional History: Here for fraxel
Name Of Surgery: Upper bleph

## 2024-05-13 NOTE — PROCEDURE: DIAGNOSIS COMMENT
Comment: Patient stated she is happy with the improvement in the scar since last visit. Will plan to add additional filler today & subsize with filler (27g) needle. Educated patient to massage filler.
Detail Level: Simple
Render Risk Assessment In Note?: no
Comment: Interested in treatments for sclero. Patient previously went to a vein clinic but did not have the best outcome. Discussed going to [facility] for a second opinion because do not think that veins are best suitable for sclero

## 2024-05-13 NOTE — PROCEDURE: SUBCISION
Detail Level: Simple
Treatment Number: 0
Incision Prior To Subcision?: yes
Subcision Instrument: 26G needle
Topical Anesthesia?: 30% lidocaine, plasticized base
Hemostasis: Pressure
Wound Care: Petrolatum
Suture Removal: 7 days
Consent was obtained from the patient. The risks and benefits to therapy were discussed in detail. Specifically, the risks of infection, scarring, bleeding, prolonged wound healing, incomplete removal, allergy to anesthesia, nerve injury and recurrence were addressed. Prior to the procedure, the treatment site was clearly identified and confirmed by the patient. All components of Universal Protocol/PAUSE Rule completed.
Pre-Procedure Prep: Prior to preparing the skin, the patient identified the scars they would like treated within the treatment area.  A small incision was made with a scalpel immediately adjacent to these areas
Post-Care Instructions: I reviewed with the patient in detail post-care instructions. Patient is to keep the biopsy site dry overnight, and then apply bacitracin twice daily until healed. Patient may apply hydrogen peroxide soaks to remove any crusting.

## 2024-05-13 NOTE — PROCEDURE: FILLERS
Additional Area 5 Volume In Cc: 0
Use Map Statement For Sites (Optional): No
Consent: Risks reviewed including but not limited to bruising, irregular texture, incomplete augmentation and procedural pain. Written consent obtained.
Map Statment: See Attach Map for Complete Details
Lot #: [id number] *JDW Special*
Lot #: [id number]
Expiration Date (Month Year): 11/07/2024
Expiration Date (Month Year): 6/25/25
Additional Area 1 Location: face
Topical Anesthesia?: 30% lidocaine, plasticized base
Additional Area 1 Location: L anterior knee
Detail Level: Detailed
Additional Area 1 Volume In Cc: 1
Include Cannula Length?: 1.5 inch
Filler: Juvederm Volux XC
Price (Use Numbers Only, No Special Characters Or $): 448

## 2024-05-13 NOTE — PROCEDURE: FRAXEL
Energy(Mj/Cm2): 1
Tip: 15mm
Tip: 7mm
Energy(Mj/Cm2): 60
Number Of Passes: 8
External Cooling: Mumtaz Cryo 5
Total Energy In Kj (Optional- Don't Include Units): 0.05
Location: upper and lower lids
Location: Use Location Override
Consent: Written consent obtained, risks reviewed including but not limited to pain and incomplete improvement.
Treatment Level: 4
Large Metal Eye Shield Text: The ocular mucosa was anesthetized with tetracaine. Once adequate anesthesia was optained, large metal eye shields were inserted and remained in place until the procedure was completed.
Depth In Microns (Use Numbers Only, No Special Characters Or $): 4982
Topical Anesthesia Type: 30% lidocaine, plasticized base
Add Post-Care Below To The Note: No
Length Of Topical Anesthesia Application (Optional): 45 minutes
Post-Care Instructions: I reviewed with the patient in detail post-care instructions. Patient should avoid sun until area fully healed.
Total Coverage: 11%
Small Plastic Eye Shield Text: The ocular mucosa was anesthetized with tetracaine. Once adequate anesthesia was optained, small plastic eye shields were inserted and remained in place until the procedure was completed.
Location Override: upper bleph scars
Medium Plastic Eye Shield Text: The ocular mucosa was anesthetized with tetracaine. Once adequate anesthesia was optained, medium plastic eye shields were inserted and remained in place until the procedure was completed.
Price (Use Numbers Only, No Special Characters Or $): 401
Small Metal Eye Shield Text: The ocular mucosa was anesthetized with tetracaine. Once adequate anesthesia was optained, small metal eye shields were inserted and remained in place until the procedure was completed.
Detail Level: Zone
Total Energy In Kj (Optional- Don't Include Units): 0.11
Large Plastic Eye Shield Text: The ocular mucosa was anesthetized with tetracaine. Once adequate anesthesia was optained, large plastic eye shields were inserted and remained in place until the procedure was completed.
Wavelength: 1550nm
Indication: photodamage
Medium Metal Eye Shield Text: The ocular mucosa was anesthetized with tetracaine. Once adequate anesthesia was optained, medium metal eye shields were inserted and remained in place until the procedure was completed.

## 2024-05-13 NOTE — PROCEDURE: TREATMENT REGIMEN
Samples Given: Alastin skin nectar
Detail Level: Zone
Initiate Treatment: Alastin skin nectar, medical barrier cream until healed post fraxel

## 2024-06-19 ENCOUNTER — APPOINTMENT (RX ONLY)
Dept: URBAN - METROPOLITAN AREA CLINIC 173 | Facility: CLINIC | Age: 59
Setting detail: DERMATOLOGY
End: 2024-06-19

## 2024-06-19 DIAGNOSIS — Z41.9 ENCOUNTER FOR PROCEDURE FOR PURPOSES OTHER THAN REMEDYING HEALTH STATE, UNSPECIFIED: ICD-10-CM

## 2024-06-19 PROCEDURE — ? BOTOX

## 2024-06-19 PROCEDURE — ? FILLERS

## 2024-06-19 PROCEDURE — ? OTHER (COSMETIC)

## 2024-06-19 NOTE — PROCEDURE: BOTOX
Show Orbicularis Oculi Units: Yes
Forehead Units: 0
Show Right And Left Pupillary Line Units: No
Detail Level: Zone
Additional Area 3 Location: masseters
Expiration Date (Month Year): 6/26
Additional Area 1 Location: Face
Additional Area 2 Location: Upper cutaneous lip
Consent: Written consent obtained. Risks include but not limited to lid/brow ptosis, bruising, swelling, diplopia, temporary effect, incomplete chemical denervation.
Additional Area 1 Units: 78
Price (Use Numbers Only, No Special Characters Or $): 188
Lot #: [id number]
Incrementing Botox Units: By 0.5 Units
Post-Care Instructions: Patient instructed to not lie down for 4 hours and limit physical activity for 24 hours. Patient instructed not to travel by airplane for 48 hours.
Dilution (U/0.1 Cc): 1

## 2024-06-19 NOTE — PROCEDURE: OTHER (COSMETIC)
Other (Free Text): Discussed in room with patient about using Body Silk and OTC CeraVe SA cream to arms.patient also asked about collagen , patient recommended using Besha collagen.
Detail Level: Zone

## 2024-06-19 NOTE — PROCEDURE: FILLERS
Jawline Filler Volume In Cc: 0
Lot #: [id number]
Post-Care Instructions: Patient instructed to apply ice to reduce swelling.
Include Cannula Size?: 25G
Consent: Written consent obtained. Risks include but not limited to bruising, beading, irregular texture, ulceration, infection, allergic reaction, scar formation, incomplete augmentation, temporary nature, procedural pain.
Include Cannula Information In Note?: No
Additional Area 1 Location: left knee
Expiration Date (Month Year): 08/04/2021
Detail Level: Zone
Topical Anesthesia?: 2.5% lidocaine, 2.5% prilocaine
Additional Area 2 Location: Hands
Include Cannula Length?: 1.5 inch
Additional Area 1 Volume In Cc: 0.3
Price (Use Numbers Only, No Special Characters Or $): 300
Additional Area 3 Location: chin
Filler: Juvederm Volux XC
Include Cannula Information In Note?: Yes
Map Statment: See Attach Map for Complete Details
Additional Area 1 Location: Face
Aspiration Statement: Aspiration was performed prior to injecting site with filler.
Additional Area 2 Location: ear lobes
Lot #: [id number]
Lot #: [id number] * [facility] SPECIAL *
Anesthesia Type: 1% lidocaine with epinephrine and a 1:10 solution of 8.4% sodium bicarbonate
Expiration Date (Month Year): 12/16/24
Expiration Date (Month Year): 08/31/2020

## 2024-06-21 ENCOUNTER — APPOINTMENT (RX ONLY)
Dept: URBAN - METROPOLITAN AREA CLINIC 173 | Facility: CLINIC | Age: 59
Setting detail: DERMATOLOGY
End: 2024-06-21

## 2024-06-21 DIAGNOSIS — Z41.9 ENCOUNTER FOR PROCEDURE FOR PURPOSES OTHER THAN REMEDYING HEALTH STATE, UNSPECIFIED: ICD-10-CM

## 2024-06-21 PROCEDURE — ? PULSED-DYE LASER

## 2024-06-21 ASSESSMENT — LOCATION DETAILED DESCRIPTION DERM
LOCATION DETAILED: RIGHT SUPERIOR VERMILION LIP
LOCATION DETAILED: RIGHT UPPER CUTANEOUS LIP
LOCATION DETAILED: LEFT UPPER CUTANEOUS LIP

## 2024-06-21 ASSESSMENT — LOCATION SIMPLE DESCRIPTION DERM
LOCATION SIMPLE: LEFT LIP
LOCATION SIMPLE: RIGHT LIP

## 2024-06-21 ASSESSMENT — LOCATION ZONE DERM: LOCATION ZONE: LIP

## 2024-06-21 NOTE — PROCEDURE: PULSED-DYE LASER
Pulse Count: 3
Laser Type: Vbeam 595nm
Spot Size: 10 mm
Delay Time (Ms): 20
Cryogen Time (Ms): 30
Pulse Duration: 10 ms
Pulse Duration: 6 ms
Post-Procedure Care: Vaseline and ice applied. Post care reviewed with patient.
Detail Level: Simple
Spot Size: 7 mm
Consent: Written consent obtained, risks reviewed including but not limited to crusting, scabbing, blistering, scarring, darker or lighter pigmentary change, incidental hair removal, bruising, and/or incomplete removal.
Fluence In J/Cm2 (Optional): 7.00
Price (Use Numbers Only, No Special Characters Or $): 0
Post-Care Instructions: I reviewed with the patient in detail post-care instructions. Patient should stay away from the sun and wear sun protection until treated areas are fully healed.
Location Override: upper lip
Fluence In J/Cm2 (Optional): 8.00

## 2024-06-25 ENCOUNTER — APPOINTMENT (RX ONLY)
Dept: URBAN - METROPOLITAN AREA CLINIC 173 | Facility: CLINIC | Age: 59
Setting detail: DERMATOLOGY
End: 2024-06-25

## 2024-06-25 DIAGNOSIS — Z41.9 ENCOUNTER FOR PROCEDURE FOR PURPOSES OTHER THAN REMEDYING HEALTH STATE, UNSPECIFIED: ICD-10-CM

## 2024-06-25 PROCEDURE — ? FRAXEL

## 2024-06-25 PROCEDURE — ? TREATMENT REGIMEN

## 2024-06-25 NOTE — PROCEDURE: FRAXEL
Energy(Mj/Cm2): 1
Tip: 15mm
Tip: 7mm
Energy(Mj/Cm2): 60
Number Of Passes: 8
External Cooling: Mumtaz Cryo 5
Total Energy In Kj (Optional- Don't Include Units): 0.03
Location: upper and lower lids
Location: Use Location Override
Consent: Written consent obtained, risks reviewed including but not limited to pain and incomplete improvement.
Treatment Level: 4
Large Metal Eye Shield Text: The ocular mucosa was anesthetized with tetracaine. Once adequate anesthesia was optained, large metal eye shields were inserted and remained in place until the procedure was completed.
Treatment Number: 2
Depth In Microns (Use Numbers Only, No Special Characters Or $): 3687
Topical Anesthesia Type: 30% lidocaine, plasticized base
Add Post-Care Below To The Note: No
Length Of Topical Anesthesia Application (Optional): 45 minutes
Post-Care Instructions: I reviewed with the patient in detail post-care instructions. Patient should avoid sun until area fully healed.
Total Coverage: 11%
Small Plastic Eye Shield Text: The ocular mucosa was anesthetized with tetracaine. Once adequate anesthesia was optained, small plastic eye shields were inserted and remained in place until the procedure was completed.
Location Override: upper bleph scars
Medium Plastic Eye Shield Text: The ocular mucosa was anesthetized with tetracaine. Once adequate anesthesia was optained, medium plastic eye shields were inserted and remained in place until the procedure was completed.
Price (Use Numbers Only, No Special Characters Or $): 541
Small Metal Eye Shield Text: The ocular mucosa was anesthetized with tetracaine. Once adequate anesthesia was optained, small metal eye shields were inserted and remained in place until the procedure was completed.
Detail Level: Zone
Total Energy In Kj (Optional- Don't Include Units): .15
Large Plastic Eye Shield Text: The ocular mucosa was anesthetized with tetracaine. Once adequate anesthesia was optained, large plastic eye shields were inserted and remained in place until the procedure was completed.
Wavelength: 1550nm
Indication: photodamage
Medium Metal Eye Shield Text: The ocular mucosa was anesthetized with tetracaine. Once adequate anesthesia was optained, medium metal eye shields were inserted and remained in place until the procedure was completed.

## 2024-06-28 ENCOUNTER — APPOINTMENT (RX ONLY)
Dept: URBAN - METROPOLITAN AREA CLINIC 173 | Facility: CLINIC | Age: 59
Setting detail: DERMATOLOGY
End: 2024-06-28

## 2024-06-28 DIAGNOSIS — Z41.9 ENCOUNTER FOR PROCEDURE FOR PURPOSES OTHER THAN REMEDYING HEALTH STATE, UNSPECIFIED: ICD-10-CM

## 2024-06-28 DIAGNOSIS — L81.1 CHLOASMA: ICD-10-CM

## 2024-06-28 PROCEDURE — ? TREATMENT REGIMEN

## 2024-06-28 PROCEDURE — ? PRESCRIPTION

## 2024-06-28 PROCEDURE — ? COUNSELING

## 2024-06-28 RX ORDER — PHARMACY COMPOUNDING ACCESSORY
G EACH MISCELLANEOUS NIGHTLY
Qty: 30 | Refills: 1 | Status: ERX

## 2024-06-28 RX ORDER — PHARMACY COMPOUNDING ACCESSORY
EACH MISCELLANEOUS
Qty: 30 | Refills: 4 | Status: ERX

## 2024-06-28 ASSESSMENT — LOCATION SIMPLE DESCRIPTION DERM
LOCATION SIMPLE: RIGHT CHEEK
LOCATION SIMPLE: LEFT CHEEK

## 2024-06-28 ASSESSMENT — LOCATION ZONE DERM: LOCATION ZONE: FACE

## 2024-06-28 ASSESSMENT — LOCATION DETAILED DESCRIPTION DERM
LOCATION DETAILED: RIGHT CENTRAL MALAR CHEEK
LOCATION DETAILED: LEFT CENTRAL MALAR CHEEK

## 2024-06-28 NOTE — PROCEDURE: TREATMENT REGIMEN
Detail Level: Zone
Initiate Treatment: - Start tretinoin 0.025% gel, 2% glycerin W06 base, 30gram tube\\n- Information about [facility] given to patient
Initiate Treatment: Start hydroquinone 8%, tretinoin 0.025%, kojic acid 1%, niacinamide 4%, fluocinolone 0.025% cream in W06 base.  Apply a thin layer nightly for 4-5 months.

## 2024-07-25 ENCOUNTER — APPOINTMENT (RX ONLY)
Dept: URBAN - METROPOLITAN AREA CLINIC 173 | Facility: CLINIC | Age: 59
Setting detail: DERMATOLOGY
End: 2024-07-25

## 2024-07-25 DIAGNOSIS — Z41.9 ENCOUNTER FOR PROCEDURE FOR PURPOSES OTHER THAN REMEDYING HEALTH STATE, UNSPECIFIED: ICD-10-CM

## 2024-07-25 DIAGNOSIS — L72.0 EPIDERMAL CYST: ICD-10-CM

## 2024-07-25 PROCEDURE — ? TREATMENT REGIMEN

## 2024-07-25 PROCEDURE — ? FRAXEL

## 2024-07-25 PROCEDURE — ? BENIGN DESTRUCTION COSMETIC MULTI

## 2024-07-25 ASSESSMENT — LOCATION DETAILED DESCRIPTION DERM
LOCATION DETAILED: LEFT LATERAL SUPERIOR EYELID
LOCATION DETAILED: RIGHT LATERAL SUPERIOR EYELID

## 2024-07-25 ASSESSMENT — LOCATION SIMPLE DESCRIPTION DERM
LOCATION SIMPLE: LEFT SUPERIOR EYELID
LOCATION SIMPLE: RIGHT SUPERIOR EYELID

## 2024-07-25 ASSESSMENT — LOCATION ZONE DERM: LOCATION ZONE: EYELID

## 2024-07-25 NOTE — PROCEDURE: FRAXEL
Energy(Mj/Cm2): 1
Tip: 15mm
Tip: 7mm
Energy(Mj/Cm2): 60
Number Of Passes: 8
External Cooling: Mumtaz Cryo 5
Total Energy In Kj (Optional- Don't Include Units): 0.21
Location: upper and lower lids
Location: Use Location Override
Consent: Written consent obtained, risks reviewed including but not limited to pain and incomplete improvement.
Treatment Level: 6
Large Metal Eye Shield Text: The ocular mucosa was anesthetized with tetracaine. Once adequate anesthesia was optained, large metal eye shields were inserted and remained in place until the procedure was completed.
Treatment Number: 3
Depth In Microns (Use Numbers Only, No Special Characters Or $): 0906
Topical Anesthesia Type: 30% lidocaine, plasticized base
Add Post-Care Below To The Note: No
Length Of Topical Anesthesia Application (Optional): 45 minutes
Post-Care Instructions: I reviewed with the patient in detail post-care instructions. Patient should avoid sun until area fully healed.
Total Coverage: 17%
Small Plastic Eye Shield Text: The ocular mucosa was anesthetized with tetracaine. Once adequate anesthesia was optained, small plastic eye shields were inserted and remained in place until the procedure was completed.
Location Override: upper bleph scars
Medium Plastic Eye Shield Text: The ocular mucosa was anesthetized with tetracaine. Once adequate anesthesia was optained, medium plastic eye shields were inserted and remained in place until the procedure was completed.
Price (Use Numbers Only, No Special Characters Or $): 749
Small Metal Eye Shield Text: The ocular mucosa was anesthetized with tetracaine. Once adequate anesthesia was optained, small metal eye shields were inserted and remained in place until the procedure was completed.
Detail Level: Zone
Total Energy In Kj (Optional- Don't Include Units): 0.19
Large Plastic Eye Shield Text: The ocular mucosa was anesthetized with tetracaine. Once adequate anesthesia was optained, large plastic eye shields were inserted and remained in place until the procedure was completed.
Wavelength: 1550nm
Indication: surgical scars
Medium Metal Eye Shield Text: The ocular mucosa was anesthetized with tetracaine. Once adequate anesthesia was optained, medium metal eye shields were inserted and remained in place until the procedure was completed.

## 2024-07-25 NOTE — PROCEDURE: BENIGN DESTRUCTION COSMETIC MULTI
Post-Care Instructions: I reviewed with the patient in detail post-care instructions. Patient is to wear sunprotection, and avoid picking at any of the treated lesions. Pt may apply Vaseline to crusted or scabbing areas.
Total Number Of Lesions Treated: 3
Consent: The patient's consent was obtained including but not limited to risks of crusting, scabbing, blistering, scarring, darker or lighter pigmentary change, recurrence, incomplete removal and infection.
Anesthesia Volume In Cc: 0.5
Detail Level: Zone
Topical Anesthesia?: 30% lidocaine, plasticized base

## 2024-09-11 ENCOUNTER — APPOINTMENT (RX ONLY)
Dept: URBAN - METROPOLITAN AREA CLINIC 173 | Facility: CLINIC | Age: 59
Setting detail: DERMATOLOGY
End: 2024-09-11

## 2024-09-11 DIAGNOSIS — Z41.9 ENCOUNTER FOR PROCEDURE FOR PURPOSES OTHER THAN REMEDYING HEALTH STATE, UNSPECIFIED: ICD-10-CM

## 2024-09-11 PROCEDURE — ? FILLERS

## 2024-09-11 PROCEDURE — ? DYSPORT

## 2024-09-11 PROCEDURE — ? BOTOX

## 2024-09-11 NOTE — PROCEDURE: FILLERS
Mid Face Filler Volume In Cc: 0
Additional Area 2 Location: ear lobes
Expiration Date (Month Year): 08/31/2020
Include Cannula Information In Note?: Yes
Anesthesia Type: 1% lidocaine with epinephrine and a 1:10 solution of 8.4% sodium bicarbonate
Include Cannula Length?: 1.5 inch
Nasolabial Folds Filler Volume In Cc: 0.2
Lot #: [id number]
Expiration Date (Month Year): 10/17/26
Include Cannula Information In Note?: No
Include Cannula Size?: 25G
Additional Area 1 Location: face
Consent: Written consent obtained. Risks include but not limited to bruising, beading, irregular texture, ulceration, infection, allergic reaction, scar formation, incomplete augmentation, temporary nature, procedural pain.
Lot #: [id number]
Anesthesia Volume In Cc: 0.6
Detail Level: Zone
Post-Care Instructions: Patient instructed to apply ice to reduce swelling.
Price (Use Numbers Only, No Special Characters Or $): 5075
Expiration Date (Month Year): 08/04/2021
Map Statment: See Attach Map for Complete Details
Additional Area 2 Location: Hands
Additional Area 3 Location: chin
Lot #: [id number]
Filler: RHA 3
Aspiration Statement: Aspiration was performed prior to injecting site with filler.
Cheeks Filler Volume In Cc: 1.8

## 2024-09-11 NOTE — PROCEDURE: BOTOX
Show Right And Left Periorbital Units: No
Periorbital Skin Units: 0
Post-Care Instructions: Patient instructed to not lie down for 4 hours and limit physical activity for 24 hours. Patient instructed not to travel by airplane for 48 hours.
Show Additional Area 2: Yes
Lot #: [id number]
Additional Area 2 Location: Upper cutaneous lip
Dilution (U/0.1 Cc): 1
Additional Area 1 Location: Face
Incrementing Botox Units: By 0.5 Units
Additional Area 1 Units: 45
Expiration Date (Month Year): 11/26
Price (Use Numbers Only, No Special Characters Or $): 358
Additional Area 3 Location: masseters
Detail Level: Zone
Consent: Written consent obtained. Risks include but not limited to lid/brow ptosis, bruising, swelling, diplopia, temporary effect, incomplete chemical denervation.

## 2024-09-11 NOTE — PROCEDURE: DYSPORT
Additional Area 5 Units: 0
Show Levator Superior Units: Yes
Additional Area 1 Location: face
Additional Area 1 Units: 86
Show Ucl Units: No
Consent: Written consent obtained. Risks include but not limited to lid/brow ptosis, bruising, swelling, diplopia, temporary effect, incomplete chemical denervation.
Post-Care Instructions: Patient instructed to not lie down for 4 hours and limit physical activity for 24 hours. Patient instructed not to travel by airplane for 48 hours.
Additional Area 2 Location: neck
Detail Level: Zone
Lot #: [id number]
Additional Area 3 Location: Perioral
Dilution (U/0.1 Cc): 10
Expiration Date (Month Year): 3/31/26
Price (Use Numbers Only, No Special Characters Or $): 387

## 2024-10-15 ENCOUNTER — APPOINTMENT (RX ONLY)
Dept: URBAN - METROPOLITAN AREA CLINIC 173 | Facility: CLINIC | Age: 59
Setting detail: DERMATOLOGY
End: 2024-10-15

## 2024-10-15 DIAGNOSIS — Z41.9 ENCOUNTER FOR PROCEDURE FOR PURPOSES OTHER THAN REMEDYING HEALTH STATE, UNSPECIFIED: ICD-10-CM

## 2024-10-15 PROCEDURE — ? ELLACOR MICRO-CORING

## 2024-10-15 NOTE — PROCEDURE: ELLACOR MICRO-CORING
Treatment Number (Optional): 0
Consent: Written consent obtained, risks reviewed including but not limited to: redness, swelling, bruising, burning, dryness, roughness, tightness/pulling of skin, crusting, pain/discomfort, tenderness, tingling, bleeding, numbness, skin peeling, circular marks on the skin, itching, hyper/hypopigmentation, infection, and scarring.    Patient understands the procedure is cosmetic in nature and will require out of pocket payment.
Anesthesia Type: 1% lidocaine with epinephrine
Post-Care Instructions: After the procedure, keep the treated area covered with ointment for 1-2 days.  Avoid makeup and harsh skin products for one week.   Practice diligent sun protection and sun avoidance.  Typical downtime is 3 days to 1 week.
Detail Level: Zone
Price (Use Numbers Only, No Special Characters Or $): 1750

## 2024-12-01 NOTE — PROCEDURE: BOTOX
Lot #: [id number]
Masseter Units: 0
Detail Level: Zone
Dilution (U/0.1 Cc): 4
Additional Area 1 Location: Face
Expiration Date (Month Year): 03/20
Consent: Written consent obtained. Risks include but not limited to lid/brow ptosis, bruising, swelling, diplopia, temporary effect, incomplete chemical denervation.
Additional Area 1 Units: 45
Name band;

## 2024-12-02 ENCOUNTER — APPOINTMENT (RX ONLY)
Dept: URBAN - METROPOLITAN AREA CLINIC 173 | Facility: CLINIC | Age: 59
Setting detail: DERMATOLOGY
End: 2024-12-02

## 2024-12-02 DIAGNOSIS — Z41.9 ENCOUNTER FOR PROCEDURE FOR PURPOSES OTHER THAN REMEDYING HEALTH STATE, UNSPECIFIED: ICD-10-CM

## 2024-12-02 PROCEDURE — ? ELLACOR MICRO-CORING

## 2024-12-02 PROCEDURE — ? OTHER (COSMETIC)

## 2024-12-02 ASSESSMENT — LOCATION SIMPLE DESCRIPTION DERM
LOCATION SIMPLE: RIGHT CHEEK
LOCATION SIMPLE: LEFT CHEEK

## 2024-12-02 ASSESSMENT — LOCATION DETAILED DESCRIPTION DERM
LOCATION DETAILED: RIGHT SUPERIOR MEDIAL BUCCAL CHEEK
LOCATION DETAILED: LEFT INFERIOR CENTRAL MALAR CHEEK

## 2024-12-02 ASSESSMENT — LOCATION ZONE DERM: LOCATION ZONE: FACE

## 2024-12-02 NOTE — PROCEDURE: OTHER (COSMETIC)
Other (Free Text): Texted with patient in the evening, she stated she was doing well, had some bruising, and will keep follow up for PDL Thursday
Detail Level: Zone

## 2024-12-02 NOTE — HPI: COSMETIC (MICRO-CHANNELING)
Have You Had Micro-Channeling Before?: has not had previous treatments
When Outside In The Sun, Do You...: always burns, never tans

## 2024-12-02 NOTE — PROCEDURE: ELLACOR MICRO-CORING
Number Of Prepaid Treatments (Will Not Render If 0): 0
Consent: Written consent obtained, risks reviewed including but not limited to: redness, swelling, bruising, burning, dryness, roughness, tightness/pulling of skin, crusting, pain/discomfort, tenderness, tingling, bleeding, numbness, skin peeling, circular marks on the skin, itching, hyper/hypopigmentation, infection, and scarring.    Patient understands the procedure is cosmetic in nature and will require out of pocket payment.
Price (Use Numbers Only, No Special Characters Or $): 9607
Detail Level: Zone
Post-Care Instructions: After the procedure, keep the treated area covered with ointment for 1-2 days.  Avoid makeup and harsh skin products for one week.   Practice diligent sun protection and sun avoidance.  Typical downtime is 3 days to 1 week.
Treatment Number (Optional): 1
Skin Type: I
Anesthesia Type: 0.05% lidocaine with 1:100,000 epinephrine and a 1:100 solution of 8.4% sodium bicarbonate
Length Of Topical Anesthesia Application (Optional): 60 minutes
Topical Anesthesia?: 2.5% lidocaine, 2.5% prilocaine

## 2024-12-05 ENCOUNTER — APPOINTMENT (RX ONLY)
Dept: URBAN - METROPOLITAN AREA CLINIC 173 | Facility: CLINIC | Age: 59
Setting detail: DERMATOLOGY
End: 2024-12-05

## 2024-12-05 DIAGNOSIS — Z41.9 ENCOUNTER FOR PROCEDURE FOR PURPOSES OTHER THAN REMEDYING HEALTH STATE, UNSPECIFIED: ICD-10-CM

## 2024-12-05 PROCEDURE — ? PULSED-DYE LASER

## 2024-12-05 NOTE — PROCEDURE: PULSED-DYE LASER
Immediate Endpoint: erythema
Spot Size: 10 mm
Spot Size: 7 mm
Delay Time (Ms): 20
Fluence In J/Cm2 (Optional): 8
Pulse Count (Location 2): 12
Location Override: chin
Pulse Duration: 6 ms
Pulse Duration: 10 ms
Consent: Written consent obtained, risks reviewed including but not limited to crusting, scabbing, blistering, scarring, darker or lighter pigmentary change, incidental hair removal, bruising, and/or incomplete removal.
Cryogen Time (Ms): 30
Post-Procedure Care: Vaseline and ice applied. Post care reviewed with patient.
Detail Level: Zone
Fluence In J/Cm2 (Optional): 7
Post-Care Instructions: I reviewed with the patient in detail post-care instructions. Patient should stay away from the sun and wear sun protection until treated areas are fully healed.
Pulse Count: 1
Location Override: right side of lip
Laser Type: Vbeam 595nm

## 2024-12-10 ENCOUNTER — APPOINTMENT (RX ONLY)
Dept: URBAN - METROPOLITAN AREA CLINIC 173 | Facility: CLINIC | Age: 59
Setting detail: DERMATOLOGY
End: 2024-12-10

## 2024-12-10 DIAGNOSIS — Z41.9 ENCOUNTER FOR PROCEDURE FOR PURPOSES OTHER THAN REMEDYING HEALTH STATE, UNSPECIFIED: ICD-10-CM

## 2024-12-10 PROCEDURE — ? DYSPORT

## 2024-12-10 PROCEDURE — ? OTHER (COSMETIC)

## 2024-12-10 PROCEDURE — ? BOTOX

## 2024-12-10 PROCEDURE — ? TREATMENT REGIMEN

## 2024-12-10 NOTE — PROCEDURE: BOTOX
Show Right And Left Periorbital Units: No
Periorbital Skin Units: 0
Post-Care Instructions: Patient instructed to not lie down for 4 hours and limit physical activity for 24 hours. Patient instructed not to travel by airplane for 48 hours.
Show Additional Area 2: Yes
Lot #: [id number]
Additional Area 2 Location: Upper cutaneous lip
Dilution (U/0.1 Cc): 1
Additional Area 1 Location: Face
Incrementing Botox Units: By 0.5 Units
Additional Area 1 Units: 46
Expiration Date (Month Year): 3/27
Price (Use Numbers Only, No Special Characters Or $): 868
Additional Area 3 Location: masseters
Detail Level: Zone
Consent: Written consent obtained. Risks include but not limited to lid/brow ptosis, bruising, swelling, diplopia, temporary effect, incomplete chemical denervation.

## 2024-12-10 NOTE — PROCEDURE: OTHER (COSMETIC)
Detail Level: Zone
Other (Free Text): Patient is healing well one week status post Ellacor.  She is can to continue aggressive use of sun, avoidance/sun protection.

## 2024-12-10 NOTE — PROCEDURE: DYSPORT
Show Orbicularis Oculi Units: Yes
Additional Area 2 Location: neck
Left Pupillary Line Units: 0
Show Mentalis Units: No
Additional Area 2 Units: 73
Dilution (U/0.1 Cc): 10
Additional Area 3 Location: Perioral
Lot #: [id number]
Post-Care Instructions: Patient instructed to not lie down for 4 hours and limit physical activity for 24 hours. Patient instructed not to travel by airplane for 48 hours.
Consent: Written consent obtained. Risks include but not limited to lid/brow ptosis, bruising, swelling, diplopia, temporary effect, incomplete chemical denervation.
Additional Area 1 Location: face
Price (Use Numbers Only, No Special Characters Or $): 752
Detail Level: Zone
Expiration Date (Month Year): 5/31/26

## 2024-12-19 ENCOUNTER — APPOINTMENT (OUTPATIENT)
Dept: URBAN - METROPOLITAN AREA CLINIC 173 | Facility: CLINIC | Age: 59
Setting detail: DERMATOLOGY
End: 2024-12-19

## 2024-12-19 DIAGNOSIS — Z41.9 ENCOUNTER FOR PROCEDURE FOR PURPOSES OTHER THAN REMEDYING HEALTH STATE, UNSPECIFIED: ICD-10-CM

## 2024-12-19 PROCEDURE — ? OTHER (COSMETIC)

## 2024-12-19 PROCEDURE — ? BOTOX

## 2024-12-19 NOTE — PROCEDURE: BOTOX
Right Pupillary Line Units: 0
Detail Level: Zone
Show Right And Left Brow Units: No
Show Depressor Anguli Units: Yes
Additional Area 3 Units: 3
Expiration Date (Month Year): 3/27
Additional Area 2 Location: Upper cutaneous lip
Post-Care Instructions: Patient instructed to not lie down for 4 hours and limit physical activity for 24 hours. Patient instructed not to travel by airplane for 48 hours.
Consent: Written consent obtained. Risks include but not limited to lid/brow ptosis, bruising, swelling, diplopia, temporary effect, incomplete chemical denervation.
Lot #: [id number] *[facility] Special*
Dilution (U/0.1 Cc): 1
Incrementing Botox Units: By 0.5 Units
Additional Area 1 Location: Face
Additional Area 3 Location: lips

## 2024-12-19 NOTE — PROCEDURE: OTHER (COSMETIC)
Detail Level: Zone
Other (Free Text): Recommended using hydrocortisone BID on the face for the next two days.\\nTalked to patient about increasing overall hydration.\\nTold patient she could book a Cate glow to help as well.\\n.\\n.\\nRecommended Haily lotion and silk shield SPF

## 2025-01-21 ENCOUNTER — APPOINTMENT (OUTPATIENT)
Dept: URBAN - METROPOLITAN AREA CLINIC 173 | Facility: CLINIC | Age: 60
Setting detail: DERMATOLOGY
End: 2025-01-21

## 2025-01-21 DIAGNOSIS — Z41.9 ENCOUNTER FOR PROCEDURE FOR PURPOSES OTHER THAN REMEDYING HEALTH STATE, UNSPECIFIED: ICD-10-CM

## 2025-01-21 PROCEDURE — ? FRAXEL

## 2025-01-21 ASSESSMENT — LOCATION ZONE DERM
LOCATION ZONE: FACE
LOCATION ZONE: NECK

## 2025-01-21 ASSESSMENT — LOCATION DETAILED DESCRIPTION DERM
LOCATION DETAILED: RIGHT MEDIAL FOREHEAD
LOCATION DETAILED: LEFT INFERIOR ANTERIOR NECK

## 2025-01-21 ASSESSMENT — LOCATION SIMPLE DESCRIPTION DERM
LOCATION SIMPLE: LEFT ANTERIOR NECK
LOCATION SIMPLE: RIGHT FOREHEAD

## 2025-01-21 NOTE — PROCEDURE: FRAXEL
Large Metal Eye Shield Text: The ocular mucosa was anesthetized with tetracaine. Once adequate anesthesia was optained, large metal eye shields were inserted and remained in place until the procedure was completed.
Treatment Level: 6
Energy(Mj/Cm2): 1
Consent: Written consent obtained, risks reviewed including but not limited to pain and incomplete improvement.
Total Energy In Kj (Optional- Don't Include Units): 0.43
Location: neck
Energy(Mj/Cm2): 65
Number Of Passes: 8
Tip: 15mm
Medium Metal Eye Shield Text: The ocular mucosa was anesthetized with tetracaine. Once adequate anesthesia was optained, medium metal eye shields were inserted and remained in place until the procedure was completed.
Add Post-Care Below To The Note: No
Indication: photodamage
Large Plastic Eye Shield Text: The ocular mucosa was anesthetized with tetracaine. Once adequate anesthesia was optained, large plastic eye shields were inserted and remained in place until the procedure was completed.
Wavelength: 1550nm
Energy(Mj/Cm2): 10
Small Metal Eye Shield Text: The ocular mucosa was anesthetized with tetracaine. Once adequate anesthesia was optained, small metal eye shields were inserted and remained in place until the procedure was completed.
Post-Care Instructions: I reviewed with the patient in detail post-care instructions. Patient should avoid sun until area fully healed.
Total Energy In Kj (Optional- Don't Include Units): 3.61
Treatment Level: 4
Detail Level: Zone
Medium Plastic Eye Shield Text: The ocular mucosa was anesthetized with tetracaine. Once adequate anesthesia was optained, medium plastic eye shields were inserted and remained in place until the procedure was completed.
Total Energy In Kj (Optional- Don't Include Units): 0.81
Energy(Mj/Cm2): 45
Price (Use Numbers Only, No Special Characters Or $): 4323
Wavelength: 1927nm
Length Of Topical Anesthesia Application (Optional): 60 minutes
External Cooling Fan Speed: 5
Small Plastic Eye Shield Text: The ocular mucosa was anesthetized with tetracaine. Once adequate anesthesia was optained, small plastic eye shields were inserted and remained in place until the procedure was completed.
Topical Anesthesia Type: 30% lidocaine, plasticized base
Location: full face

## 2025-01-30 ENCOUNTER — RX ONLY (RX ONLY)
Age: 60
End: 2025-01-30

## 2025-01-30 RX ORDER — CLINDAMYCIN PHOSPHATE 10 MG/ML
LOTION TOPICAL
Qty: 60 | Refills: 0 | Status: ERX | COMMUNITY
Start: 2025-01-30

## 2025-03-07 ENCOUNTER — APPOINTMENT (OUTPATIENT)
Dept: URBAN - METROPOLITAN AREA CLINIC 173 | Facility: CLINIC | Age: 60
Setting detail: DERMATOLOGY
End: 2025-03-07

## 2025-03-07 DIAGNOSIS — Z41.9 ENCOUNTER FOR PROCEDURE FOR PURPOSES OTHER THAN REMEDYING HEALTH STATE, UNSPECIFIED: ICD-10-CM

## 2025-03-07 PROCEDURE — ? ELLACOR MICRO-CORING

## 2025-03-07 NOTE — PROCEDURE: ELLACOR MICRO-CORING
Price (Use Numbers Only, No Special Characters Or $): 5958
Post-Care Instructions: After the procedure, keep the treated area covered with ointment for 1-2 days.  Avoid makeup and harsh skin products for one week.   Practice diligent sun protection and sun avoidance.  Typical downtime is 3 days to 1 week.
Detail Level: Zone
Anesthesia Type: 1% lidocaine with epinephrine
Treatment Number (Optional): 0
Consent: Written consent obtained, risks reviewed including but not limited to: redness, swelling, bruising, burning, dryness, roughness, tightness/pulling of skin, crusting, pain/discomfort, tenderness, tingling, bleeding, numbness, skin peeling, circular marks on the skin, itching, hyper/hypopigmentation, infection, and scarring.    Patient understands the procedure is cosmetic in nature and will require out of pocket payment.

## 2025-04-08 ENCOUNTER — APPOINTMENT (OUTPATIENT)
Dept: URBAN - METROPOLITAN AREA CLINIC 173 | Facility: CLINIC | Age: 60
Setting detail: DERMATOLOGY
End: 2025-04-08

## 2025-04-08 DIAGNOSIS — Z41.9 ENCOUNTER FOR PROCEDURE FOR PURPOSES OTHER THAN REMEDYING HEALTH STATE, UNSPECIFIED: ICD-10-CM

## 2025-04-08 PROCEDURE — ? DYSPORT

## 2025-04-08 PROCEDURE — ? FILLERS

## 2025-04-08 PROCEDURE — ? BOTOX

## 2025-04-08 PROCEDURE — ? PULSED-DYE LASER

## 2025-04-08 ASSESSMENT — LOCATION SIMPLE DESCRIPTION DERM: LOCATION SIMPLE: RIGHT CHEEK

## 2025-04-08 ASSESSMENT — LOCATION DETAILED DESCRIPTION DERM: LOCATION DETAILED: RIGHT INFERIOR LATERAL MALAR CHEEK

## 2025-04-08 ASSESSMENT — LOCATION ZONE DERM: LOCATION ZONE: FACE

## 2025-04-08 NOTE — PROCEDURE: FILLERS
Marionette Lines Filler Volume In Cc: 0
Lot #: [id number] *[facility] SPECIAL*
Price (Use Numbers Only, No Special Characters Or $): 0832
Include Cannula Size?: 25G
Include Cannula Information In Note?: No
Include Cannula Length?: 1.5 inch
Expiration Date (Month Year): 11-12-25
Topical Anesthesia?: 2.5% lidocaine, 2.5% prilocaine
Additional Area 1 Location: Face
Map Statment: See Attach Map for Complete Details
Additional Area 2 Location: Hands
Filler: Malvin Garcia
Additional Area 3 Location: chin
Aspiration Statement: Aspiration was performed prior to injecting site with filler.
Additional Area 3 Volume In Cc: 0.2
Cheeks Filler Volume In Cc: 0.8
Lot #: [id number]
Additional Area 1 Volume In Cc: 0.3
Expiration Date (Month Year): 08/31/2020
Anesthesia Type: 2% lidocaine with epinephrine and a 1:12 solution of 8.4% sodium bicarbonate
Additional Area 2 Location: ear lobes
Include Cannula Information In Note?: Yes
Lot #: [id number]
Filler: Skinvive
Consent: Written consent obtained. Risks include but not limited to bruising, beading, irregular texture, ulceration, infection, allergic reaction, scar formation, incomplete augmentation, temporary nature, procedural pain.
Expiration Date (Month Year): 3-31-27
Post-Care Instructions: Patient instructed to apply ice to reduce swelling.
Detail Level: Zone

## 2025-04-08 NOTE — PROCEDURE: DYSPORT
Right Pupillary Line Units: 0
Show Lcl Units: No
Post-Care Instructions: Patient instructed to not lie down for 4 hours and limit physical activity for 24 hours. Patient instructed not to travel by airplane for 48 hours.
Additional Area 2 Location: neck
Detail Level: Zone
Show Glabellar Units: Yes
Lot #: [id number]
Additional Area 3 Location: Perioral
Dilution (U/0.1 Cc): 10
Expiration Date (Month Year): 7/31/26
Price (Use Numbers Only, No Special Characters Or $): 034
Additional Area 1 Location: face
Additional Area 1 Units: 71
Consent: Written consent obtained. Risks include but not limited to lid/brow ptosis, bruising, swelling, diplopia, temporary effect, incomplete chemical denervation.

## 2025-04-08 NOTE — PROCEDURE: PULSED-DYE LASER
Spot Size: 10 mm
Consent: Written consent obtained, risks reviewed including but not limited to crusting, scabbing, blistering, scarring, darker or lighter pigmentary change, incidental hair removal, bruising, and/or incomplete removal.
Immediate Endpoint: purpura
Laser Type: Vbeam 595nm
Pulse Count: 2
Pulse Duration: 10 ms
Delay Time (Ms): 20
Cryogen Time (Ms): 30
Post-Care Instructions: I reviewed with the patient in detail post-care instructions. Patient should stay away from the sun and wear sun protection until treated areas are fully healed.
Treated Area: small area
Spot Size: 7 mm
Pulse Duration: 3 ms
Post-Procedure Care: ice applied. Post care reviewed with patient.
Pulse Count (Location 2): 1
Fluence In J/Cm2 (Optional): 7.50
Location Override: right cheek
Fluence In J/Cm2 (Optional): 8.00
Detail Level: Zone

## 2025-04-08 NOTE — PROCEDURE: BOTOX
Right Periorbital Skin Units: 0
Show Lateral Platysmal Band Units: Yes
Detail Level: Zone
Consent: Written consent obtained. Risks include but not limited to lid/brow ptosis, bruising, swelling, diplopia, temporary effect, incomplete chemical denervation.
Post-Care Instructions: Patient instructed to not lie down for 4 hours and limit physical activity for 24 hours. Patient instructed not to travel by airplane for 48 hours.
Show Ucl Units: No
Additional Area 1 Location: Face
Additional Area 1 Units: 43
Lot #: [id number]
Dilution (U/0.1 Cc): 1
Additional Area 3 Location: masseters
Incrementing Botox Units: By 0.5 Units
Additional Area 2 Location: Upper cutaneous lip
Expiration Date (Month Year): 5/2027
Price (Use Numbers Only, No Special Characters Or $): 741

## 2025-05-07 ENCOUNTER — APPOINTMENT (OUTPATIENT)
Dept: URBAN - METROPOLITAN AREA CLINIC 173 | Facility: CLINIC | Age: 60
Setting detail: DERMATOLOGY
End: 2025-05-07

## 2025-05-07 DIAGNOSIS — Z41.9 ENCOUNTER FOR PROCEDURE FOR PURPOSES OTHER THAN REMEDYING HEALTH STATE, UNSPECIFIED: ICD-10-CM

## 2025-05-07 PROCEDURE — ? ELLACOR MICRO-CORING

## 2025-05-07 ASSESSMENT — LOCATION DETAILED DESCRIPTION DERM
LOCATION DETAILED: RIGHT INFERIOR CENTRAL MALAR CHEEK
LOCATION DETAILED: RIGHT CHIN
LOCATION DETAILED: SUBMENTAL CHIN
LOCATION DETAILED: LEFT INFERIOR CENTRAL MALAR CHEEK

## 2025-05-07 ASSESSMENT — LOCATION SIMPLE DESCRIPTION DERM
LOCATION SIMPLE: RIGHT CHEEK
LOCATION SIMPLE: CHIN
LOCATION SIMPLE: SUBMENTAL CHIN
LOCATION SIMPLE: LEFT CHEEK

## 2025-05-07 ASSESSMENT — LOCATION ZONE DERM: LOCATION ZONE: FACE

## 2025-05-07 NOTE — HPI: COSMETIC (MICRO-CHANNELING)
Have You Had Micro-Channeling Before?: has had previous treatments
When Was Your Last Treatment?: 12/5/24

## 2025-05-07 NOTE — PROCEDURE: ELLACOR MICRO-CORING
Price (Use Numbers Only, No Special Characters Or $): 1666
Post-Care Instructions: After the procedure, keep the treated area covered with ointment for 1-2 days.  Avoid makeup and harsh skin products for one week.   Practice diligent sun protection and sun avoidance.  Typical downtime is 3 days to 1 week.
Number Of Prepaid Treatments (Will Not Render If 0): 0
Consent: Written consent obtained, risks reviewed including but not limited to: redness, swelling, bruising, burning, dryness, roughness, tightness/pulling of skin, crusting, pain/discomfort, tenderness, tingling, bleeding, numbness, skin peeling, circular marks on the skin, itching, hyper/hypopigmentation, infection, and scarring.    Patient understands the procedure is cosmetic in nature and will require out of pocket payment.
Anesthesia Type: 1% lidocaine with epinephrine
Detail Level: Zone

## 2025-05-09 ENCOUNTER — APPOINTMENT (OUTPATIENT)
Dept: URBAN - METROPOLITAN AREA CLINIC 173 | Facility: CLINIC | Age: 60
Setting detail: DERMATOLOGY
End: 2025-05-09

## 2025-05-09 DIAGNOSIS — Z41.9 ENCOUNTER FOR PROCEDURE FOR PURPOSES OTHER THAN REMEDYING HEALTH STATE, UNSPECIFIED: ICD-10-CM

## 2025-05-09 PROCEDURE — ? PULSED-DYE LASER

## 2025-05-09 ASSESSMENT — LOCATION DETAILED DESCRIPTION DERM
LOCATION DETAILED: LEFT INFERIOR CENTRAL MALAR CHEEK
LOCATION DETAILED: SUBMENTAL CHIN
LOCATION DETAILED: RIGHT INFERIOR CENTRAL MALAR CHEEK

## 2025-05-09 ASSESSMENT — LOCATION SIMPLE DESCRIPTION DERM
LOCATION SIMPLE: RIGHT CHEEK
LOCATION SIMPLE: SUBMENTAL CHIN
LOCATION SIMPLE: LEFT CHEEK

## 2025-05-09 ASSESSMENT — LOCATION ZONE DERM: LOCATION ZONE: FACE

## 2025-05-09 NOTE — PROCEDURE: PULSED-DYE LASER
Pulse Duration: 10 ms
Cryogen Time (Ms): 30
Immediate Endpoint: erythema
Treated Area: medium area
Detail Level: Zone
Spot Size: 10 mm
Spot Size: 7 mm
Fluence In J/Cm2 (Optional): 7
Delay Time (Ms): 20
Laser Type: Vbeam 595nm
Consent: Written consent obtained, risks reviewed including but not limited to crusting, scabbing, blistering, scarring, darker or lighter pigmentary change, incidental hair removal, bruising, and/or incomplete removal.
Location (Required For Details To Render In Note): lower face
Post-Care Instructions: I reviewed with the patient in detail post-care instructions. Patient should stay away from the sun and wear sun protection until treated areas are fully healed.
Fluence In J/Cm2 (Optional): 7.50
Post-Procedure Care: Post care reviewed with patient.
Pulse Count (Location 2): 10

## 2025-06-02 NOTE — PROCEDURE: BOTOX
----- Message from BERNIE De La Torre sent at 6/2/2025 12:50 PM CDT -----  Please let the patient know that her strep culture was negative. Thank you.   
Glabellar Complex Units: 0
Patient notified of below test results and instructions.  Understanding verbalized.    
Detail Level: Zone
Consent: Written consent obtained. Risks include but not limited to lid/brow ptosis, bruising, swelling, diplopia, temporary effect, incomplete chemical denervation.
Dilution (U/0.1 Cc): 4
Additional Area 1 Location: Face
Expiration Date (Month Year): 12/20
Lot #: [id number]
Additional Area 1 Units: 45

## 2025-06-03 RX ORDER — PHARMACY COMPOUNDING ACCESSORY
EACH MISCELLANEOUS
Qty: 30 | Refills: 4 | Status: ERX

## 2025-06-03 RX ORDER — PHARMACY COMPOUNDING ACCESSORY
G EACH MISCELLANEOUS NIGHTLY
Qty: 30 | Refills: 1 | Status: ERX

## 2025-07-28 ENCOUNTER — APPOINTMENT (OUTPATIENT)
Dept: URBAN - METROPOLITAN AREA CLINIC 173 | Facility: CLINIC | Age: 60
Setting detail: DERMATOLOGY
End: 2025-07-28

## 2025-07-28 DIAGNOSIS — L71.8 OTHER ROSACEA: ICD-10-CM

## 2025-07-28 DIAGNOSIS — Z41.9 ENCOUNTER FOR PROCEDURE FOR PURPOSES OTHER THAN REMEDYING HEALTH STATE, UNSPECIFIED: ICD-10-CM

## 2025-07-28 DIAGNOSIS — L82.1 OTHER SEBORRHEIC KERATOSIS: ICD-10-CM

## 2025-07-28 DIAGNOSIS — L72.0 EPIDERMAL CYST: ICD-10-CM

## 2025-07-28 PROCEDURE — ? PRESCRIPTION

## 2025-07-28 PROCEDURE — ? TREATMENT REGIMEN

## 2025-07-28 PROCEDURE — ? COSMETIC CONSULTATION - PULSED-DYE LASER

## 2025-07-28 PROCEDURE — ? LIQUID NITROGEN (COSMETIC)

## 2025-07-28 PROCEDURE — ? COUNSELING

## 2025-07-28 PROCEDURE — ? FILLERS

## 2025-07-28 PROCEDURE — ? COSMETIC EXTRACTIONS

## 2025-07-28 RX ORDER — OXYMETAZOLINE HYDROCHLORIDE 30 G/1
CREAM TOPICAL
Qty: 30 | Refills: 3 | Status: ERX | COMMUNITY
Start: 2025-07-28

## 2025-07-28 RX ADMIN — OXYMETAZOLINE HYDROCHLORIDE: 30 CREAM TOPICAL at 00:00

## 2025-07-28 ASSESSMENT — LOCATION DETAILED DESCRIPTION DERM
LOCATION DETAILED: LEFT PROXIMAL DORSAL FOREARM
LOCATION DETAILED: RIGHT CENTRAL MALAR CHEEK
LOCATION DETAILED: LEFT CENTRAL MALAR CHEEK
LOCATION DETAILED: LEFT DISTAL POSTERIOR UPPER ARM
LOCATION DETAILED: RIGHT PROXIMAL DORSAL FOREARM
LOCATION DETAILED: RIGHT KNEE

## 2025-07-28 ASSESSMENT — LOCATION SIMPLE DESCRIPTION DERM
LOCATION SIMPLE: RIGHT KNEE
LOCATION SIMPLE: LEFT CHEEK
LOCATION SIMPLE: RIGHT CHEEK
LOCATION SIMPLE: RIGHT FOREARM
LOCATION SIMPLE: LEFT FOREARM
LOCATION SIMPLE: LEFT POSTERIOR UPPER ARM

## 2025-07-28 ASSESSMENT — LOCATION ZONE DERM
LOCATION ZONE: FACE
LOCATION ZONE: LEG
LOCATION ZONE: ARM

## 2025-08-05 ENCOUNTER — APPOINTMENT (OUTPATIENT)
Dept: URBAN - METROPOLITAN AREA CLINIC 173 | Facility: CLINIC | Age: 60
Setting detail: DERMATOLOGY
End: 2025-08-05

## 2025-08-05 DIAGNOSIS — Z41.9 ENCOUNTER FOR PROCEDURE FOR PURPOSES OTHER THAN REMEDYING HEALTH STATE, UNSPECIFIED: ICD-10-CM

## 2025-08-05 PROCEDURE — ? COSMETIC CONSULTATION: SOFWAVE

## 2025-08-05 PROCEDURE — ? DYSPORT

## 2025-08-05 PROCEDURE — ? BOTOX

## 2025-08-05 PROCEDURE — ? FILLERS

## 2025-08-26 ENCOUNTER — APPOINTMENT (OUTPATIENT)
Dept: URBAN - METROPOLITAN AREA CLINIC 173 | Facility: CLINIC | Age: 60
Setting detail: DERMATOLOGY
End: 2025-08-26

## 2025-08-26 DIAGNOSIS — D18.0 HEMANGIOMA: ICD-10-CM

## 2025-08-26 DIAGNOSIS — L81.4 OTHER MELANIN HYPERPIGMENTATION: ICD-10-CM

## 2025-08-26 DIAGNOSIS — D22 MELANOCYTIC NEVI: ICD-10-CM

## 2025-08-26 DIAGNOSIS — L82.1 OTHER SEBORRHEIC KERATOSIS: ICD-10-CM

## 2025-08-26 DIAGNOSIS — Z41.9 ENCOUNTER FOR PROCEDURE FOR PURPOSES OTHER THAN REMEDYING HEALTH STATE, UNSPECIFIED: ICD-10-CM

## 2025-08-26 DIAGNOSIS — Z71.89 OTHER SPECIFIED COUNSELING: ICD-10-CM

## 2025-08-26 PROBLEM — D22.5 MELANOCYTIC NEVI OF TRUNK: Status: ACTIVE | Noted: 2025-08-26

## 2025-08-26 PROBLEM — D18.01 HEMANGIOMA OF SKIN AND SUBCUTANEOUS TISSUE: Status: ACTIVE | Noted: 2025-08-26

## 2025-08-26 PROCEDURE — ? COUNSELING

## 2025-08-26 PROCEDURE — ? DIAGNOSIS COMMENT

## 2025-08-26 PROCEDURE — ? COSMETIC CONSULTATION: SOFWAVE

## 2025-08-26 PROCEDURE — ? PULSED-DYE LASER

## 2025-08-26 PROCEDURE — ? DEFER

## 2025-08-26 ASSESSMENT — LOCATION SIMPLE DESCRIPTION DERM
LOCATION SIMPLE: CHEST
LOCATION SIMPLE: RIGHT FOREARM
LOCATION SIMPLE: LEFT LOWER BACK
LOCATION SIMPLE: RIGHT SHOULDER
LOCATION SIMPLE: LEFT FOREARM
LOCATION SIMPLE: RIGHT UPPER BACK

## 2025-08-26 ASSESSMENT — LOCATION DETAILED DESCRIPTION DERM
LOCATION DETAILED: RIGHT ANTERIOR SHOULDER
LOCATION DETAILED: RIGHT PROXIMAL DORSAL FOREARM
LOCATION DETAILED: RIGHT SUPERIOR UPPER BACK
LOCATION DETAILED: LEFT SUPERIOR LATERAL LOWER BACK
LOCATION DETAILED: RIGHT LATERAL SUPERIOR CHEST
LOCATION DETAILED: LEFT PROXIMAL DORSAL FOREARM

## 2025-08-26 ASSESSMENT — LOCATION ZONE DERM
LOCATION ZONE: ARM
LOCATION ZONE: TRUNK

## 2025-09-02 ENCOUNTER — APPOINTMENT (OUTPATIENT)
Dept: URBAN - METROPOLITAN AREA CLINIC 173 | Facility: CLINIC | Age: 60
Setting detail: DERMATOLOGY
End: 2025-09-02

## 2025-09-02 DIAGNOSIS — Z41.9 ENCOUNTER FOR PROCEDURE FOR PURPOSES OTHER THAN REMEDYING HEALTH STATE, UNSPECIFIED: ICD-10-CM

## 2025-09-02 PROCEDURE — ? SOFWAVE

## 2025-09-02 ASSESSMENT — LOCATION ZONE DERM: LOCATION ZONE: FACE

## 2025-09-02 ASSESSMENT — LOCATION DETAILED DESCRIPTION DERM
LOCATION DETAILED: LEFT INFERIOR CENTRAL MALAR CHEEK
LOCATION DETAILED: SUBMENTAL CHIN

## 2025-09-02 ASSESSMENT — LOCATION SIMPLE DESCRIPTION DERM
LOCATION SIMPLE: SUBMENTAL CHIN
LOCATION SIMPLE: LEFT CHEEK